# Patient Record
Sex: FEMALE | Race: OTHER | Employment: OTHER | URBAN - METROPOLITAN AREA
[De-identification: names, ages, dates, MRNs, and addresses within clinical notes are randomized per-mention and may not be internally consistent; named-entity substitution may affect disease eponyms.]

---

## 2024-01-29 ENCOUNTER — APPOINTMENT (EMERGENCY)
Dept: RADIOLOGY | Facility: HOSPITAL | Age: 69
End: 2024-01-29
Payer: OTHER GOVERNMENT

## 2024-01-29 ENCOUNTER — HOSPITAL ENCOUNTER (EMERGENCY)
Facility: HOSPITAL | Age: 69
End: 2024-01-30
Attending: EMERGENCY MEDICINE
Payer: OTHER GOVERNMENT

## 2024-01-29 VITALS
TEMPERATURE: 98.2 F | DIASTOLIC BLOOD PRESSURE: 64 MMHG | SYSTOLIC BLOOD PRESSURE: 140 MMHG | WEIGHT: 210 LBS | HEART RATE: 87 BPM | RESPIRATION RATE: 20 BRPM | OXYGEN SATURATION: 95 %

## 2024-01-29 DIAGNOSIS — N39.0 UTI (URINARY TRACT INFECTION): ICD-10-CM

## 2024-01-29 DIAGNOSIS — F32.A DEPRESSION: Primary | ICD-10-CM

## 2024-01-29 PROBLEM — Z00.8 ENCOUNTER FOR PSYCHOLOGICAL EVALUATION: Status: ACTIVE | Noted: 2024-01-29

## 2024-01-29 LAB
ALBUMIN SERPL BCP-MCNC: 3.7 G/DL (ref 3.5–5)
ALP SERPL-CCNC: 81 U/L (ref 34–104)
ALT SERPL W P-5'-P-CCNC: 10 U/L (ref 7–52)
AMPHETAMINES SERPL QL SCN: NEGATIVE
ANION GAP SERPL CALCULATED.3IONS-SCNC: 5 MMOL/L
AST SERPL W P-5'-P-CCNC: 13 U/L (ref 13–39)
ATRIAL RATE: 79 BPM
BACTERIA UR QL AUTO: ABNORMAL /HPF
BACTERIA UR QL AUTO: ABNORMAL /HPF
BARBITURATES UR QL: NEGATIVE
BASOPHILS # BLD MANUAL: 0 THOUSAND/UL (ref 0–0.1)
BASOPHILS NFR MAR MANUAL: 0 % (ref 0–1)
BENZODIAZ UR QL: NEGATIVE
BILIRUB SERPL-MCNC: 0.71 MG/DL (ref 0.2–1)
BILIRUB UR QL STRIP: NEGATIVE
BILIRUB UR QL STRIP: NEGATIVE
BUN SERPL-MCNC: 10 MG/DL (ref 5–25)
CALCIUM SERPL-MCNC: 9.5 MG/DL (ref 8.4–10.2)
CHLORIDE SERPL-SCNC: 103 MMOL/L (ref 96–108)
CLARITY UR: ABNORMAL
CLARITY UR: CLEAR
CO2 SERPL-SCNC: 28 MMOL/L (ref 21–32)
COCAINE UR QL: NEGATIVE
COLOR UR: YELLOW
COLOR UR: YELLOW
CREAT SERPL-MCNC: 0.54 MG/DL (ref 0.6–1.3)
D DIMER PPP FEU-MCNC: 0.33 UG/ML FEU
EOSINOPHIL # BLD MANUAL: 0 THOUSAND/UL (ref 0–0.4)
EOSINOPHIL NFR BLD MANUAL: 0 % (ref 0–6)
ERYTHROCYTE [DISTWIDTH] IN BLOOD BY AUTOMATED COUNT: 14 % (ref 11.6–15.1)
ETHANOL EXG-MCNC: 0 MG/DL
GFR SERPL CREATININE-BSD FRML MDRD: 97 ML/MIN/1.73SQ M
GLUCOSE SERPL-MCNC: 104 MG/DL (ref 65–140)
GLUCOSE UR STRIP-MCNC: NEGATIVE MG/DL
GLUCOSE UR STRIP-MCNC: NEGATIVE MG/DL
HCT VFR BLD AUTO: 46.6 % (ref 34.8–46.1)
HGB BLD-MCNC: 15 G/DL (ref 11.5–15.4)
HGB UR QL STRIP.AUTO: ABNORMAL
HGB UR QL STRIP.AUTO: NEGATIVE
KETONES UR STRIP-MCNC: NEGATIVE MG/DL
KETONES UR STRIP-MCNC: NEGATIVE MG/DL
LEUKOCYTE ESTERASE UR QL STRIP: ABNORMAL
LEUKOCYTE ESTERASE UR QL STRIP: ABNORMAL
LYMPHOCYTES # BLD AUTO: 0.74 THOUSAND/UL (ref 0.6–4.47)
LYMPHOCYTES # BLD AUTO: 11 % (ref 14–44)
MACROCYTES BLD QL AUTO: PRESENT
MCH RBC QN AUTO: 32.8 PG (ref 26.8–34.3)
MCHC RBC AUTO-ENTMCNC: 32.2 G/DL (ref 31.4–37.4)
MCV RBC AUTO: 102 FL (ref 82–98)
METHADONE UR QL: NEGATIVE
MONOCYTES # BLD AUTO: 0.2 THOUSAND/UL (ref 0–1.22)
MONOCYTES NFR BLD: 3 % (ref 4–12)
MUCOUS THREADS UR QL AUTO: ABNORMAL
MUCOUS THREADS UR QL AUTO: ABNORMAL
NEUTROPHILS # BLD MANUAL: 5.77 THOUSAND/UL (ref 1.85–7.62)
NEUTS SEG NFR BLD AUTO: 86 % (ref 43–75)
NITRITE UR QL STRIP: POSITIVE
NITRITE UR QL STRIP: POSITIVE
NON-SQ EPI CELLS URNS QL MICRO: ABNORMAL /HPF
NON-SQ EPI CELLS URNS QL MICRO: ABNORMAL /HPF
OPIATES UR QL SCN: NEGATIVE
OXYCODONE+OXYMORPHONE UR QL SCN: NEGATIVE
P AXIS: 43 DEGREES
PCP UR QL: NEGATIVE
PH UR STRIP.AUTO: 6 [PH]
PH UR STRIP.AUTO: 6 [PH] (ref 4.5–8)
PLATELET # BLD AUTO: 264 THOUSANDS/UL (ref 149–390)
PLATELET BLD QL SMEAR: ADEQUATE
PMV BLD AUTO: 9.5 FL (ref 8.9–12.7)
POTASSIUM SERPL-SCNC: 4.1 MMOL/L (ref 3.5–5.3)
PR INTERVAL: 128 MS
PROT SERPL-MCNC: 6.6 G/DL (ref 6.4–8.4)
PROT UR STRIP-MCNC: NEGATIVE MG/DL
PROT UR STRIP-MCNC: NEGATIVE MG/DL
QRS AXIS: -13 DEGREES
QRSD INTERVAL: 78 MS
QT INTERVAL: 372 MS
QTC INTERVAL: 426 MS
RBC # BLD AUTO: 4.57 MILLION/UL (ref 3.81–5.12)
RBC #/AREA URNS AUTO: ABNORMAL /HPF
RBC #/AREA URNS AUTO: ABNORMAL /HPF
RBC MORPH BLD: PRESENT
SODIUM SERPL-SCNC: 136 MMOL/L (ref 135–147)
SP GR UR STRIP.AUTO: 1.02 (ref 1–1.03)
SP GR UR STRIP.AUTO: 1.02 (ref 1–1.03)
T WAVE AXIS: 47 DEGREES
THC UR QL: NEGATIVE
TSH SERPL DL<=0.05 MIU/L-ACNC: 3.28 UIU/ML (ref 0.45–4.5)
UROBILINOGEN UR QL STRIP.AUTO: 0.2 E.U./DL
UROBILINOGEN UR STRIP-ACNC: <2 MG/DL
VENTRICULAR RATE: 79 BPM
WBC # BLD AUTO: 6.71 THOUSAND/UL (ref 4.31–10.16)
WBC #/AREA URNS AUTO: ABNORMAL /HPF
WBC #/AREA URNS AUTO: ABNORMAL /HPF

## 2024-01-29 PROCEDURE — 36415 COLL VENOUS BLD VENIPUNCTURE: CPT

## 2024-01-29 PROCEDURE — 84443 ASSAY THYROID STIM HORMONE: CPT

## 2024-01-29 PROCEDURE — 99284 EMERGENCY DEPT VISIT MOD MDM: CPT

## 2024-01-29 PROCEDURE — 80053 COMPREHEN METABOLIC PANEL: CPT

## 2024-01-29 PROCEDURE — 99285 EMERGENCY DEPT VISIT HI MDM: CPT | Performed by: EMERGENCY MEDICINE

## 2024-01-29 PROCEDURE — 71046 X-RAY EXAM CHEST 2 VIEWS: CPT

## 2024-01-29 PROCEDURE — 85007 BL SMEAR W/DIFF WBC COUNT: CPT

## 2024-01-29 PROCEDURE — 81001 URINALYSIS AUTO W/SCOPE: CPT

## 2024-01-29 PROCEDURE — 93005 ELECTROCARDIOGRAM TRACING: CPT

## 2024-01-29 PROCEDURE — 85379 FIBRIN DEGRADATION QUANT: CPT

## 2024-01-29 PROCEDURE — 80307 DRUG TEST PRSMV CHEM ANLYZR: CPT

## 2024-01-29 PROCEDURE — 85027 COMPLETE CBC AUTOMATED: CPT

## 2024-01-29 PROCEDURE — 82075 ASSAY OF BREATH ETHANOL: CPT

## 2024-01-29 RX ORDER — ATORVASTATIN CALCIUM 20 MG/1
20 TABLET, FILM COATED ORAL
Status: DISCONTINUED | OUTPATIENT
Start: 2024-01-29 | End: 2024-01-30 | Stop reason: HOSPADM

## 2024-01-29 RX ORDER — HYDROXYZINE HYDROCHLORIDE 25 MG/1
25 TABLET, FILM COATED ORAL
Status: CANCELLED | OUTPATIENT
Start: 2024-01-29

## 2024-01-29 RX ORDER — ACETAMINOPHEN 325 MG/1
650 TABLET ORAL EVERY 6 HOURS PRN
Status: DISCONTINUED | OUTPATIENT
Start: 2024-01-29 | End: 2024-01-30 | Stop reason: HOSPADM

## 2024-01-29 RX ORDER — LEVOTHYROXINE SODIUM 0.07 MG/1
150 TABLET ORAL
Status: DISCONTINUED | OUTPATIENT
Start: 2024-01-30 | End: 2024-01-30 | Stop reason: HOSPADM

## 2024-01-29 RX ORDER — MAGNESIUM HYDROXIDE/ALUMINUM HYDROXICE/SIMETHICONE 120; 1200; 1200 MG/30ML; MG/30ML; MG/30ML
30 SUSPENSION ORAL EVERY 4 HOURS PRN
Status: CANCELLED | OUTPATIENT
Start: 2024-01-29

## 2024-01-29 RX ORDER — RISPERIDONE 0.25 MG/1
0.5 TABLET ORAL
Status: CANCELLED | OUTPATIENT
Start: 2024-01-29

## 2024-01-29 RX ORDER — RISPERIDONE 1 MG/1
1 TABLET ORAL
Status: CANCELLED | OUTPATIENT
Start: 2024-01-29

## 2024-01-29 RX ORDER — LOSARTAN POTASSIUM 25 MG/1
25 TABLET ORAL DAILY
Status: DISCONTINUED | OUTPATIENT
Start: 2024-01-30 | End: 2024-01-30 | Stop reason: HOSPADM

## 2024-01-29 RX ORDER — LANOLIN ALCOHOL/MO/W.PET/CERES
3 CREAM (GRAM) TOPICAL
Status: CANCELLED | OUTPATIENT
Start: 2024-01-29

## 2024-01-29 RX ORDER — RISPERIDONE 0.25 MG/1
0.25 TABLET ORAL
Status: CANCELLED | OUTPATIENT
Start: 2024-01-29

## 2024-01-29 RX ORDER — CLOPIDOGREL BISULFATE 75 MG/1
75 TABLET ORAL ONCE
Status: COMPLETED | OUTPATIENT
Start: 2024-01-29 | End: 2024-01-29

## 2024-01-29 RX ORDER — HALOPERIDOL 5 MG/ML
5 INJECTION INTRAMUSCULAR
Status: CANCELLED | OUTPATIENT
Start: 2024-01-29

## 2024-01-29 RX ORDER — TRAMADOL HYDROCHLORIDE 50 MG/1
50 TABLET ORAL DAILY PRN
Status: DISCONTINUED | OUTPATIENT
Start: 2024-01-29 | End: 2024-01-30 | Stop reason: HOSPADM

## 2024-01-29 RX ORDER — POLYETHYLENE GLYCOL 3350 17 G/17G
17 POWDER, FOR SOLUTION ORAL DAILY PRN
Status: DISCONTINUED | OUTPATIENT
Start: 2024-01-29 | End: 2024-01-30 | Stop reason: HOSPADM

## 2024-01-29 RX ORDER — CEPHALEXIN 500 MG/1
500 CAPSULE ORAL 4 TIMES DAILY
Qty: 20 CAPSULE | Refills: 0 | Status: DISCONTINUED | OUTPATIENT
Start: 2024-01-29 | End: 2024-01-30 | Stop reason: HOSPADM

## 2024-01-29 RX ORDER — LORAZEPAM 2 MG/ML
1 INJECTION INTRAMUSCULAR
Status: CANCELLED | OUTPATIENT
Start: 2024-01-29

## 2024-01-29 RX ADMIN — TRAMADOL HYDROCHLORIDE 50 MG: 50 TABLET, COATED ORAL at 18:41

## 2024-01-29 RX ADMIN — CEPHALEXIN 500 MG: 500 CAPSULE ORAL at 18:41

## 2024-01-29 RX ADMIN — CLOPIDOGREL BISULFATE 75 MG: 75 TABLET ORAL at 18:41

## 2024-01-29 RX ADMIN — POLYETHYLENE GLYCOL 3350 17 G: 17 POWDER, FOR SOLUTION ORAL at 19:00

## 2024-01-29 RX ADMIN — ATORVASTATIN CALCIUM 20 MG: 20 TABLET, FILM COATED ORAL at 18:41

## 2024-01-29 NOTE — ED NOTES
Pt provided with urine specimen cup; pt states she is unable to urinate at this time. Pt offered PO fluids     Amira Bland  01/29/24 6774

## 2024-01-29 NOTE — ED NOTES
Patient is accepted at [facility].  Patient is accepted by Dr. Ramila Varghese.     Transportation is arranged with [facility].     Transportation is scheduled for TBD.   Patient may go to the floor at 2130 or later.          Nurse report is to be called to [phone] prior to patient transfer.

## 2024-01-29 NOTE — ED NOTES
201 sent to Intake for a bed at [facility] when available. Pt would prefer to be closer to home at this time before extending to [facility] for bed availability in network. Crisis to follow up.

## 2024-01-29 NOTE — ED ATTENDING ATTESTATION
"I, Shawn Carmichael MD, saw and evaluated the patient. I have discussed the patient with the resident and agree with the resident's findings, Plan of Care, and MDM as documented in the resident's note, except where noted. All available labs and Radiology studies were reviewed.  I was present for key portions of any procedure(s) performed by the resident and I was immediately available to provide assistance.    At this point I agree with the current assessment done in the Emergency Department.  I have conducted an independent evaluation of this patient a history and physical is as follows:    67 yo female with a history of HTN, hypothyroidism, prior DVT, and major depressive disorder presents to the ED complaining of depression and passive suicidal ideation. The patient says she recently lost her  and doesn't want to live anymore. (+) Vague plan to \"drink and smoke myself to death\". Family moved her to the area from [place] last week. No HI or hallucinations. She also has multiple other vague complaints including dizziness, \"lung pain\", and lower back pain x several days. No chest pain, shortness of breath, nausea, vomiting, or diaphoresis. Last alcohol intake about a week ago. No other specific complaints.    ROS: per resident physician note    Gen: NAD, AA&Ox3  HEENT: PERRL, EOMI  Neck: supple  CV: RRR  Lungs: CTA B/L  Abdomen: soft, NT/ND  Ext: no swelling or deformity  Neuro: 5/5 strength all extremities, sensation grossly intact  Skin: no rash    ED Course  The patient is comfortable appearing with stable vital signs and a benign physical examination. (+) Depression and passive SI. (+) Multiple other vague complaints. Will check EKG, CXR, basic labs, d dimer, TSH, UDS, and UA. Case discussed with Crisis --> they will evaluate the patient in the ED following medical clearance. Disposition per workup and crisis worker recommendations. Will continue to monitor.    1700 The patient signed 201 paperwork. " Inpatient bed search initiated.    Critical Care Time  Procedures

## 2024-01-29 NOTE — ED NOTES
Pt presented to the ED with her sister from home due to fleeting suicidal ideations and worsening depression.  was utilized and Pt sister remained present with her permission. Pt has had worsening suicidal ideation since her  passed away. She has had thoughts of drinking or smoking herself to death, although she does not currently feel that way. She is not currently taking any antidepressants or linked to outpatient supports. She moved to the area from [place] within the last week. She was hospitalized in the past for depression in [place]. She has no desire to live anymore, but is willing to get help for this. She denies any legal involvement or substance abuse. She denies any access to firearms. She is residing at her sisiter's home and feels safe there. Pt reports recently that she has had some auditory hallucinations related to her  passing, but would not elaborate further. Denies any psychosis or HI/VH. Pt is in agreement with signing a 201 and understands her rights. ED provider in agreement with treatment plan.

## 2024-01-30 ENCOUNTER — HOSPITAL ENCOUNTER (INPATIENT)
Facility: HOSPITAL | Age: 69
LOS: 7 days | Discharge: HOME | DRG: 885 | End: 2024-02-06
Attending: STUDENT IN AN ORGANIZED HEALTH CARE EDUCATION/TRAINING PROGRAM | Admitting: STUDENT IN AN ORGANIZED HEALTH CARE EDUCATION/TRAINING PROGRAM
Payer: MEDICARE

## 2024-01-30 DIAGNOSIS — Z00.8 MEDICAL CLEARANCE FOR PSYCHIATRIC ADMISSION: ICD-10-CM

## 2024-01-30 DIAGNOSIS — N39.0 UTI (URINARY TRACT INFECTION): ICD-10-CM

## 2024-01-30 DIAGNOSIS — E78.2 MIXED HYPERLIPIDEMIA: Primary | ICD-10-CM

## 2024-01-30 DIAGNOSIS — E03.9 ACQUIRED HYPOTHYROIDISM: ICD-10-CM

## 2024-01-30 DIAGNOSIS — K59.00 CONSTIPATION, UNSPECIFIED CONSTIPATION TYPE: ICD-10-CM

## 2024-01-30 DIAGNOSIS — F33.1 MODERATE EPISODE OF RECURRENT MAJOR DEPRESSIVE DISORDER (HCC): ICD-10-CM

## 2024-01-30 DIAGNOSIS — I10 ESSENTIAL HYPERTENSION: ICD-10-CM

## 2024-01-30 PROBLEM — F33.9 MAJOR DEPRESSIVE DISORDER, RECURRENT EPISODE (HCC): Status: ACTIVE | Noted: 2024-01-30

## 2024-01-30 PROBLEM — F10.10 ALCOHOL ABUSE: Status: ACTIVE | Noted: 2024-01-30

## 2024-01-30 PROBLEM — Z72.0 TOBACCO ABUSE: Status: ACTIVE | Noted: 2024-01-30

## 2024-01-30 LAB
25(OH)D3 SERPL-MCNC: 24.1 NG/ML (ref 30–100)
ANION GAP SERPL CALCULATED.3IONS-SCNC: 8 MMOL/L
BASOPHILS # BLD AUTO: 0.03 THOUSANDS/ÂΜL (ref 0–0.1)
BASOPHILS NFR BLD AUTO: 1 % (ref 0–1)
BUN SERPL-MCNC: 9 MG/DL (ref 5–25)
CALCIUM SERPL-MCNC: 9 MG/DL (ref 8.4–10.2)
CHLORIDE SERPL-SCNC: 101 MMOL/L (ref 96–108)
CHOLEST SERPL-MCNC: 110 MG/DL
CO2 SERPL-SCNC: 28 MMOL/L (ref 21–32)
CREAT SERPL-MCNC: 0.51 MG/DL (ref 0.6–1.3)
EOSINOPHIL # BLD AUTO: 0.08 THOUSAND/ÂΜL (ref 0–0.61)
EOSINOPHIL NFR BLD AUTO: 2 % (ref 0–6)
ERYTHROCYTE [DISTWIDTH] IN BLOOD BY AUTOMATED COUNT: 13.9 % (ref 11.6–15.1)
EST. AVERAGE GLUCOSE BLD GHB EST-MCNC: 108 MG/DL
FOLATE SERPL-MCNC: 6.3 NG/ML
GFR SERPL CREATININE-BSD FRML MDRD: 99 ML/MIN/1.73SQ M
GLUCOSE P FAST SERPL-MCNC: 106 MG/DL (ref 65–99)
GLUCOSE SERPL-MCNC: 106 MG/DL (ref 65–140)
HBA1C MFR BLD: 5.4 %
HCT VFR BLD AUTO: 44 % (ref 34.8–46.1)
HDLC SERPL-MCNC: 32 MG/DL
HGB BLD-MCNC: 14.1 G/DL (ref 11.5–15.4)
IMM GRANULOCYTES # BLD AUTO: 0.01 THOUSAND/UL (ref 0–0.2)
IMM GRANULOCYTES NFR BLD AUTO: 0 % (ref 0–2)
LDLC SERPL CALC-MCNC: 66 MG/DL (ref 0–100)
LYMPHOCYTES # BLD AUTO: 0.73 THOUSANDS/ÂΜL (ref 0.6–4.47)
LYMPHOCYTES NFR BLD AUTO: 16 % (ref 14–44)
MCH RBC QN AUTO: 31.8 PG (ref 26.8–34.3)
MCHC RBC AUTO-ENTMCNC: 32 G/DL (ref 31.4–37.4)
MCV RBC AUTO: 99 FL (ref 82–98)
MONOCYTES # BLD AUTO: 0.49 THOUSAND/ÂΜL (ref 0.17–1.22)
MONOCYTES NFR BLD AUTO: 11 % (ref 4–12)
NEUTROPHILS # BLD AUTO: 3.26 THOUSANDS/ÂΜL (ref 1.85–7.62)
NEUTS SEG NFR BLD AUTO: 70 % (ref 43–75)
NONHDLC SERPL-MCNC: 78 MG/DL
NRBC BLD AUTO-RTO: 0 /100 WBCS
PLATELET # BLD AUTO: 236 THOUSANDS/UL (ref 149–390)
PMV BLD AUTO: 9.8 FL (ref 8.9–12.7)
POTASSIUM SERPL-SCNC: 3.9 MMOL/L (ref 3.5–5.3)
RBC # BLD AUTO: 4.44 MILLION/UL (ref 3.81–5.12)
SODIUM SERPL-SCNC: 137 MMOL/L (ref 135–147)
TRIGL SERPL-MCNC: 61 MG/DL
VIT B12 SERPL-MCNC: 140 PG/ML (ref 180–914)
WBC # BLD AUTO: 4.6 THOUSAND/UL (ref 4.31–10.16)

## 2024-01-30 PROCEDURE — 85025 COMPLETE CBC W/AUTO DIFF WBC: CPT | Performed by: PSYCHIATRY & NEUROLOGY

## 2024-01-30 PROCEDURE — 99223 1ST HOSP IP/OBS HIGH 75: CPT | Performed by: STUDENT IN AN ORGANIZED HEALTH CARE EDUCATION/TRAINING PROGRAM

## 2024-01-30 PROCEDURE — 82746 ASSAY OF FOLIC ACID SERUM: CPT | Performed by: PSYCHIATRY & NEUROLOGY

## 2024-01-30 PROCEDURE — 80048 BASIC METABOLIC PNL TOTAL CA: CPT | Performed by: PSYCHIATRY & NEUROLOGY

## 2024-01-30 PROCEDURE — 99221 1ST HOSP IP/OBS SF/LOW 40: CPT | Performed by: NURSE PRACTITIONER

## 2024-01-30 PROCEDURE — 82607 VITAMIN B-12: CPT | Performed by: PSYCHIATRY & NEUROLOGY

## 2024-01-30 PROCEDURE — 80061 LIPID PANEL: CPT | Performed by: PSYCHIATRY & NEUROLOGY

## 2024-01-30 PROCEDURE — 83036 HEMOGLOBIN GLYCOSYLATED A1C: CPT | Performed by: NURSE PRACTITIONER

## 2024-01-30 PROCEDURE — 82306 VITAMIN D 25 HYDROXY: CPT | Performed by: PSYCHIATRY & NEUROLOGY

## 2024-01-30 RX ORDER — SENNOSIDES 8.6 MG
2 TABLET ORAL
Status: DISCONTINUED | OUTPATIENT
Start: 2024-01-30 | End: 2024-02-06 | Stop reason: HOSPADM

## 2024-01-30 RX ORDER — LORAZEPAM 2 MG/ML
1 INJECTION INTRAMUSCULAR
Status: DISCONTINUED | OUTPATIENT
Start: 2024-01-30 | End: 2024-02-06 | Stop reason: HOSPADM

## 2024-01-30 RX ORDER — DOCUSATE SODIUM 100 MG/1
100 CAPSULE, LIQUID FILLED ORAL 2 TIMES DAILY
Status: DISCONTINUED | OUTPATIENT
Start: 2024-01-30 | End: 2024-02-06 | Stop reason: HOSPADM

## 2024-01-30 RX ORDER — POLYETHYLENE GLYCOL 3350 17 G/17G
17 POWDER, FOR SOLUTION ORAL DAILY PRN
Status: DISCONTINUED | OUTPATIENT
Start: 2024-01-30 | End: 2024-02-06 | Stop reason: HOSPADM

## 2024-01-30 RX ORDER — NICOTINE 21 MG/24HR
1 PATCH, TRANSDERMAL 24 HOURS TRANSDERMAL DAILY
Status: DISCONTINUED | OUTPATIENT
Start: 2024-01-30 | End: 2024-02-06 | Stop reason: HOSPADM

## 2024-01-30 RX ORDER — LANOLIN ALCOHOL/MO/W.PET/CERES
3 CREAM (GRAM) TOPICAL
Status: DISCONTINUED | OUTPATIENT
Start: 2024-01-30 | End: 2024-02-06 | Stop reason: HOSPADM

## 2024-01-30 RX ORDER — MELATONIN
2000 DAILY
Status: DISCONTINUED | OUTPATIENT
Start: 2024-01-31 | End: 2024-02-06 | Stop reason: HOSPADM

## 2024-01-30 RX ORDER — LOSARTAN POTASSIUM 25 MG/1
25 TABLET ORAL DAILY
Status: DISCONTINUED | OUTPATIENT
Start: 2024-01-30 | End: 2024-02-06 | Stop reason: HOSPADM

## 2024-01-30 RX ORDER — LEVOTHYROXINE SODIUM 0.15 MG/1
150 TABLET ORAL
Status: DISCONTINUED | OUTPATIENT
Start: 2024-01-30 | End: 2024-01-31

## 2024-01-30 RX ORDER — CLOPIDOGREL BISULFATE 75 MG/1
75 TABLET ORAL DAILY
Status: DISCONTINUED | OUTPATIENT
Start: 2024-01-30 | End: 2024-02-06 | Stop reason: HOSPADM

## 2024-01-30 RX ORDER — HYDROXYZINE HYDROCHLORIDE 25 MG/1
25 TABLET, FILM COATED ORAL
Status: DISCONTINUED | OUTPATIENT
Start: 2024-01-30 | End: 2024-02-06 | Stop reason: HOSPADM

## 2024-01-30 RX ORDER — CYANOCOBALAMIN 1000 UG/ML
1000 INJECTION, SOLUTION INTRAMUSCULAR; SUBCUTANEOUS ONCE
Status: COMPLETED | OUTPATIENT
Start: 2024-01-30 | End: 2024-01-30

## 2024-01-30 RX ORDER — RISPERIDONE 0.25 MG/1
0.25 TABLET ORAL
Status: DISCONTINUED | OUTPATIENT
Start: 2024-01-30 | End: 2024-02-06 | Stop reason: HOSPADM

## 2024-01-30 RX ORDER — HALOPERIDOL 5 MG/ML
5 INJECTION INTRAMUSCULAR
Status: DISCONTINUED | OUTPATIENT
Start: 2024-01-30 | End: 2024-02-06 | Stop reason: HOSPADM

## 2024-01-30 RX ORDER — RISPERIDONE 1 MG/1
1 TABLET ORAL
Status: DISCONTINUED | OUTPATIENT
Start: 2024-01-30 | End: 2024-02-06 | Stop reason: HOSPADM

## 2024-01-30 RX ORDER — RISPERIDONE 0.5 MG/1
0.5 TABLET ORAL
Status: DISCONTINUED | OUTPATIENT
Start: 2024-01-30 | End: 2024-02-06 | Stop reason: HOSPADM

## 2024-01-30 RX ORDER — MAGNESIUM HYDROXIDE/ALUMINUM HYDROXICE/SIMETHICONE 120; 1200; 1200 MG/30ML; MG/30ML; MG/30ML
30 SUSPENSION ORAL EVERY 4 HOURS PRN
Status: DISCONTINUED | OUTPATIENT
Start: 2024-01-30 | End: 2024-02-06 | Stop reason: HOSPADM

## 2024-01-30 RX ORDER — ATORVASTATIN CALCIUM 20 MG/1
20 TABLET, FILM COATED ORAL
Status: DISCONTINUED | OUTPATIENT
Start: 2024-01-30 | End: 2024-02-06 | Stop reason: HOSPADM

## 2024-01-30 RX ADMIN — LOSARTAN POTASSIUM 25 MG: 25 TABLET, FILM COATED ORAL at 11:27

## 2024-01-30 RX ADMIN — CYANOCOBALAMIN 1000 MCG: 1000 INJECTION INTRAMUSCULAR; SUBCUTANEOUS at 17:19

## 2024-01-30 RX ADMIN — POLYETHYLENE GLYCOL 3350 17 G: 17 POWDER, FOR SOLUTION ORAL at 11:27

## 2024-01-30 RX ADMIN — NICOTINE 1 PATCH: 14 PATCH, EXTENDED RELEASE TRANSDERMAL at 13:32

## 2024-01-30 RX ADMIN — DOCUSATE SODIUM 100 MG: 100 CAPSULE, LIQUID FILLED ORAL at 17:19

## 2024-01-30 RX ADMIN — MELATONIN TAB 3 MG 3 MG: 3 TAB at 21:06

## 2024-01-30 RX ADMIN — CLOPIDOGREL BISULFATE 75 MG: 75 TABLET ORAL at 11:27

## 2024-01-30 RX ADMIN — LEVOTHYROXINE SODIUM 150 MCG: 150 TABLET ORAL at 11:28

## 2024-01-30 RX ADMIN — SENNOSIDES 17.2 MG: 8.6 TABLET, FILM COATED ORAL at 21:06

## 2024-01-30 RX ADMIN — ATORVASTATIN CALCIUM 20 MG: 20 TABLET, FILM COATED ORAL at 17:19

## 2024-01-30 RX ADMIN — DOCUSATE SODIUM 100 MG: 100 CAPSULE, LIQUID FILLED ORAL at 11:27

## 2024-01-30 NOTE — ASSESSMENT & PLAN NOTE
Patient will have a nicotine patch as well as gum available to her while she is in inpatient  Smoking cessation education

## 2024-01-30 NOTE — EMTALA/ACUTE CARE TRANSFER
[facility] EMERGENCY DEPARTMENT  [address] [address]  Dept: [phone]      EMTALA TRANSFER CONSENT    NAME Ramandeep Abel                                         1955                              MRN [id number]    I have been informed of my rights regarding examination, treatment, and transfer   by Dr. Shawn Carmichael MD    Benefits: Specialized equipment and/or services available at the receiving facility (Include comment)________________________ (Behavioral health)    Risks: Potential for delay in receiving treatment, Potential deterioration of medical condition, Increased discomfort during transfer      Transfer Request   I acknowledge that my medical condition has been evaluated and explained to me by the emergency department physician or other qualified medical person and/or my attending physician who has recommended and offered to me further medical examination and treatment. I understand the Hospital's obligation with respect to the treatment and stabilization of my emergency medical condition. I nevertheless request to be transferred. I release the Hospital, the doctor, and any other persons caring for me from all responsibility or liability for any injury or ill effects that may result from my transfer and agree to accept all responsibility for the consequences of my choice to transfer, rather than receive stabilizing treatment at the Hospital. I understand that because the transfer is my request, my insurance may not provide reimbursement for the services.  The Hospital will assist and direct me and my family in how to make arrangements for transfer, but the hospital is not liable for any fees charged by the transport service.  In spite of this understanding, I refuse to consent to further medical examination and treatment which has been offered to me, and request transfer to Accepting Facility Name, City & State : [facility],  [place]. I authorize the performance of emergency medical procedures and treatments upon me in both transit and upon arrival at the receiving facility.  Additionally, I authorize the release of any and all medical records to the receiving facility and request they be transported with me, if possible.    I authorize the performance of emergency medical procedures and treatments upon me in both transit and upon arrival at the receiving facility.  Additionally, I authorize the release of any and all medical records to the receiving facility and request they be transported with me, if possible.  I understand that the safest mode of transportation during a medical emergency is an ambulance and that the Hospital advocates the use of this mode of transport. Risks of traveling to the receiving facility by car, including absence of medical control, life sustaining equipment, such as oxygen, and medical personnel has been explained to me and I fully understand them.    (MARIA ELENA CORRECT BOX BELOW)  [  ]  I consent to the stated transfer and to be transported by ambulance/helicopter.  [  ]  I consent to the stated transfer, but refuse transportation by ambulance and accept full responsibility for my transportation by car.  I understand the risks of non-ambulance transfers and I exonerate the Hospital and its staff from any deterioration in my condition that results from this refusal.    X___________________________________________    DATE  24  TIME________  Signature of patient or legally responsible individual signing on patient behalf           RELATIONSHIP TO PATIENT_________________________          Provider Certification    NAME Markusaurelia Georgina Abel                                        [facility] 1955                              MRN [id number]    A medical screening exam was performed on the above named patient.  Based on the examination:    Condition Necessitating Transfer The primary encounter  diagnosis was Depression. A diagnosis of UTI (urinary tract infection) was also pertinent to this visit.    Patient Condition: The patient has been stabilized such that within reasonable medical probability, no material deterioration of the patient condition or the condition of the unborn child(quinn) is likely to result from the transfer    Reason for Transfer: Level of Care needed not available at this facility    Transfer Requirements: Facility [address], [place], [place]   Space available and qualified personnel available for treatment as acknowledged by Geena LEMOS; [phone]  Agreed to accept transfer and to provide appropriate medical treatment as acknowledged by       Dr. Mcgrath  Appropriate medical records of the examination and treatment of the patient are provided at the time of transfer   STAFF INITIAL WHEN COMPLETED _______  Transfer will be performed by qualified personnel from    and appropriate transfer equipment as required, including the use of necessary and appropriate life support measures.    Provider Certification: I have examined the patient and explained the following risks and benefits of being transferred/refusing transfer to the patient/family:  General risk, such as traffic hazards, adverse weather conditions, rough terrain or turbulence, possible failure of equipment (including vehicle or aircraft), or consequences of actions of persons outside the control of the transport personnel, Unanticipated needs of medical equipment and personnel during transport, Risk of worsening condition, The possibility of a transport vehicle being unavailable      Based on these reasonable risks and benefits to the patient and/or the unborn child(quinn), and based upon the information available at the time of the patient’s examination, I certify that the medical benefits reasonably to be expected from the provision of appropriate medical treatments at another medical facility outweigh the increasing risks, if  any, to the individual’s medical condition, and in the case of labor to the unborn child, from effecting the transfer.    X____________________________________________ DATE 01/29/24        TIME_______      ORIGINAL - SEND TO MEDICAL RECORDS   COPY - SEND WITH PATIENT DURING TRANSFER

## 2024-01-30 NOTE — ASSESSMENT & PLAN NOTE
Patient will continue on her home dose of losartan 25 mg p.o. daily  Patient will continue on her Plavix 75 mg p.o. daily  We will monitor her blood pressure and additively agents as needed

## 2024-01-30 NOTE — ED NOTES
Insurance Authorization for admission:   Phone call placed to [facility]  Phone number: [phone]    Unable to do a precert due to not having the exact phone number they had on file as well as the license number

## 2024-01-30 NOTE — NURSING NOTE
"Ramandeep is a 68 y.o female admitted to the unit from [facility] ED under a 201 treatment status for worsening depression and fleeting SI thoughts to drink or smoke to death. Pt is a BMAT 4 , A/O x4. As to what brought pt to the hospital, she stated \"I have depression\". Pt reported that she lived in [place] and only came to [place] a week ago to meet her sister d/t worsening depressive symptoms. Pt attributes depressive and anxiety symptoms to recent loss of spouse 5 months ago. Pt further reported that she started drinking alcohol and smoking 1/2 pack/day since losing her . Pt reported being a smoker since she was 25 years of age. Reported last alcohol intake to be 2 weeks ago from today. Endorsing severe depression and moderate anxiety during this admission encounter. Reported ongoing back pain issues on admission.   LBM 1/26. Skin intact.     Expressed interest in being screened for Hep C  Expressed readiness to quite smoking.   "

## 2024-01-30 NOTE — PROGRESS NOTES
01/30/24 1207   Housing Stability   In the last 12 months, was there a time when you were not able to pay the mortgage or rent on time? N   In the last 12 months, how many places have you lived? 1   In the last 12 months, was there a time when you did not have a steady place to sleep or slept in a shelter (including now)? N   Transportation Needs   In the past 12 months, has lack of transportation kept you from medical appointments or from getting medications? no   In the past 12 months, has lack of transportation kept you from meetings, work, or from getting things needed for daily living? No   Food Insecurity   Within the past 12 months, you worried that your food would run out before you got the money to buy more. Never true   Within the past 12 months, the food you bought just didn't last and you didn't have money to get more. Never true   Intimate Partner Violence   Within the last year, have you been afraid of your partner or ex-partner? No   Within the last year, have you been humiliated or emotionally abused in other ways by your partner or ex-partner? No   Within the last year, have you been kicked, hit, slapped, or otherwise physically hurt by your partner or ex-partner? No   Within the last year, have you been raped or forced to have any kind of sexual activity by your partner or ex-partner? No   Utilities   In the past 12 months has the electric, gas, oil, or water company threatened to shut off services in your home? No

## 2024-01-30 NOTE — ASSESSMENT & PLAN NOTE
Patient has not had a bowel movement in 4 days  Daily senna ordered  Colace twice daily  MiraLAX as needed  Continue to monitor for routine bowel movements

## 2024-01-30 NOTE — NURSING NOTE
Patient is visible, calm, and pleasant on approach. Patient offers no c/o at the moment. Patient is medication compliant, and cooperative with assessment questions. Patient is withdrawn to room, reports depression. No behavioral issues noted. Safety checks ongoing.

## 2024-01-30 NOTE — H&P
"Psychiatric Evaluation - Behavioral Health   Ramandeep Mendez [facility] 68 y.o. female MRN: [id number]  Unit/Bed#: [facility] 643-01 Encounter: [id number]    Assessment/Plan   Active Problems:    Medical clearance for psychiatric admission    Acquired hypothyroidism    Tobacco abuse    Alcohol abuse    Plan:   Patient is currently admitted voluntarily as a 201  Plan for the following psychopharmacologic changes:  Start Prozac 10mg oral daily for depression and anxiety symptoms  Encourage group, occupational, and milieu therapy.  Collaborate with case management for disposition planning  Discuss with family for baseline assessment and disposition planning.  Admission labs reviewed  Medicine consulted for medical clearance and further medical management as indicated    Treatment options and alternatives were reviewed with the patient, who concurs with the above plan.  Risks, benefits, and possible side effects of medications were explained to the patient, who verbalizes understanding.        -----------------------------------    Chief Complaint: \"Im here for my depression and anxiety\"    History of Present Illness     Ramandeep is a 68 y.o. female with past medical history of COPD, HTN, hyperlipidemia, hypothyroidism and pertinent past psychiatric history of depression and anxiety who presents voluntarily on a 201 commitment with worsening depressive symptoms and thoughts of harming herself.    Symptoms prior to admission include worsening depressed mood, anhedonia, decreased energy, guilt, suicidal ideation, poor appetite, weight loss, and anxiety symptoms. Symptoms have been worsening for the past 5 months with slowly worsening course since that time. Stressors preceding admission include  the death of her  5 months ago and the death of a close friend 1 month ago . Please see documentation from the ED/Crisis/Consulting physician for further details about initial presentation.    On initial evaluation, " Ramandeep states that she is experiencing worsening depression and anxiety since her  passed away 5 months ago. She complains of suicidal ideation without a plan, decreased energy, generalized weakness, sadness, anhedonia, decreased appetite, guilt, and weight loss. Depressive symptoms are currently a 9/10 and anxiety symptoms are currently 7/10. She is currently here visiting her sister and currently lives in [facility]. Patient reports depression and anxiety for several years with a past hospitalization 2 years ago for depression and anxiety. Pt reports being on medications for depression and anxiety in the past but is unsure of medication names. Patient is not currently on psychiatric/antidepressant medications and is not currently following up with outpatient psychiatry/behavior health. Patient denies symptoms consistent with leida such as periods of elevated mood, distractibility, and decreased need for sleep. Patient also denies prior suicide attempts, homicidal ideation, auditory/visual hallucinations, paranoia or delusions. She is amenable to starting medication and contracts for safety. Her goal is to improve depression symptoms during this hospital admission.    Medical Review Of Systems:  Reports mild SOB, but denies any other     Psychiatric Review Of Systems:  Sleep: Denies, gets approximately 7 hours of sleep a night  Interest/Anhedonia: yes  Guilt/hopeless: Yes  Low energy/anergy: yes  Poor Concentration: no  Appetite changes: Yes, decreased  Weight changes: Yes, decreased  Somatic symptoms: no  Anxiety/panic:  reports anxiety  Leida: no  Self injurious behavior/risky behavior: no  Trauma: no   If yes: none  Hallucinations: Denies  Suicidal Ideation: Denies  Homicidal Ideation: Denies    Historical Information     Psychiatric History:   Inpatient hospitalizations: reports hospitalization 2 years ago for anxiety and depression  Suicide attempts: patient denies  Self injurious behavior:  no  Violent behavior: patient denies  Outpatient treatment: Patient reports prior   Psychiatric medication trial: Multiple, patient is unsure which medications have been trialed previously  Eating Disorder:Denies  OCD:Denies    Substance Abuse History:  Social History     Tobacco Use    Smoking status: Every Day     Current packs/day: 0.50     Average packs/day: 0.5 packs/day for 43.1 years (21.5 ttl pk-yrs)     Types: Cigarettes     Start date: 1981    Smokeless tobacco: Never   Substance Use Topics    Alcohol use: Not Currently     Comment: last ETOH drink was 2 weeks ago    Drug use: Never      Patient reports increasing tobacco use to half pack of cigarettes a day. Has been smoking for 25 years. Patient consumes 3 beers daily.     I have assessed this patient for substance use within the past 12 months.    Family Psychiatric History:   Patient denies any known family history of psychiatric illness, suicide attempt, or substance abuse    Social History:  Education: high school diploma/GED  Learning Disabilities: denies  Marital history:   Children: none  Living arrangement: lives alone in [facility]. Currently here visiting her sister  Occupational History: retired  Functioning Relationships: reports that she .  Access to Firearms: denies  Other Pertinent History: None      Traumatic History:   Abuse: denies  Other Traumatic Events: denies    Past Medical History:   Seizure history: none  Head injury: Denies  Past Medical History:   Diagnosis Date    Anxiety     Blood clot in vein     Depression     Disease of thyroid gland     Hypertension         -----------------------------------  Objective    Temp:  [97.5 °F (36.4 °C)-98.2 °F (36.8 °C)] 98.1 °F (36.7 °C)  HR:  [70-87] 81  Resp:  [16-20] 16  BP: (130-175)/(64-78) 142/67    Mental Status Evaluation:    Appearance:  age appropriate, casually dressed, dressed in hospital attire   Behavior:  normal, pleasant, cooperative   Speech:  normal rate  and volume   Mood:  depressed, crying during part of interview   Affect:  normal range and intensity, tearful   Language: naming objects and repeating phrases   Thought Process:  organized, logical, coherent, goal directed, linear   Associations: intact associations   Thought Content:  normal   Perceptual Disturbances: none   Risk Potential: Suicidal ideation - Yes, without plan  Homicidal ideation - None  Potential for aggression - No   Sensorium:  oriented to person, place, and time/date   Memory:  recent and remote memory grossly intact   Consciousness:  alert and awake   Attention/Concentration: attention span and concentration are age appropriate   Intellect: within normal limits   Fund of Knowledge: awareness of current events: yes   Insight:  limited   Judgment: limited   Muscle Strength:   Muscle Tone: normal  normal   Gait/Station: normal gait/station, normal balance   Motor Activity: no abnormal movements       Meds/Allergies   No Known Allergies  current meds:   Current Facility-Administered Medications   Medication Dose Route Frequency    aluminum-magnesium hydroxide-simethicone (MAALOX) oral suspension 30 mL  30 mL Oral Q4H PRN    atorvastatin (LIPITOR) tablet 20 mg  20 mg Oral Daily With Dinner    clopidogrel (PLAVIX) tablet 75 mg  75 mg Oral Daily    docusate sodium (COLACE) capsule 100 mg  100 mg Oral BID    haloperidol lactate (HALDOL) injection 5 mg  5 mg Intramuscular Q4H PRN Max 4/day    hydrOXYzine HCL (ATARAX) tablet 25 mg  25 mg Oral Q6H PRN Max 4/day    levothyroxine tablet 150 mcg  150 mcg Oral Early Morning    LORazepam (ATIVAN) injection 1 mg  1 mg Intramuscular Q6H PRN Max 3/day    losartan (COZAAR) tablet 25 mg  25 mg Oral Daily    melatonin tablet 3 mg  3 mg Oral HS    nicotine polacrilex (NICORETTE) gum 4 mg  4 mg Oral Q2H PRN    polyethylene glycol (MIRALAX) packet 17 g  17 g Oral Daily PRN    risperiDONE (RisperDAL) tablet 0.25 mg  0.25 mg Oral Q4H PRN Max 6/day    risperiDONE  (RisperDAL) tablet 0.5 mg  0.5 mg Oral Q4H PRN Max 3/day    risperiDONE (RisperDAL) tablet 1 mg  1 mg Oral Q2H PRN Max 3/day    senna (SENOKOT) tablet 17.2 mg  2 tablet Oral HS       Behavioral Health Medications: all current active meds have been reviewed. Changes as above.    Laboratory results:  I have personally reviewed all pertinent laboratory/tests results.  Recent Results (from the past 48 hour(s))   ECG 12 lead    Collection Time: 01/29/24 10:20 AM   Result Value Ref Range    Ventricular Rate 79 BPM    Atrial Rate 79 BPM    VA Interval 128 ms    QRSD Interval 78 ms    QT Interval 372 ms    QTC Interval 426 ms    P [place] 43 degrees    QRS Axis -13 degrees    T Wave Axis 47 degrees   CBC and differential    Collection Time: 01/29/24 12:01 PM   Result Value Ref Range    WBC 6.71 4.31 - 10.16 Thousand/uL    RBC 4.57 3.81 - 5.12 Million/uL    Hemoglobin 15.0 11.5 - 15.4 g/dL    Hematocrit 46.6 (H) 34.8 - 46.1 %     (H) 82 - 98 fL    MCH 32.8 26.8 - 34.3 pg    MCHC 32.2 31.4 - 37.4 g/dL    RDW 14.0 11.6 - 15.1 %    MPV 9.5 8.9 - 12.7 fL    Platelets 264 149 - 390 Thousands/uL   Comprehensive metabolic panel    Collection Time: 01/29/24 12:01 PM   Result Value Ref Range    Sodium 136 135 - 147 mmol/L    Potassium 4.1 3.5 - 5.3 mmol/L    Chloride 103 96 - 108 mmol/L    CO2 28 21 - 32 mmol/L    ANION GAP 5 mmol/L    BUN 10 5 - 25 mg/dL    Creatinine 0.54 (L) 0.60 - 1.30 mg/dL    Glucose 104 65 - 140 mg/dL    Calcium 9.5 8.4 - 10.2 mg/dL    AST 13 13 - 39 U/L    ALT 10 7 - 52 U/L    Alkaline Phosphatase 81 34 - 104 U/L    Total Protein 6.6 6.4 - 8.4 g/dL    Albumin 3.7 3.5 - 5.0 g/dL    Total Bilirubin 0.71 0.20 - 1.00 mg/dL    eGFR 97 ml/min/1.73sq m   TSH    Collection Time: 01/29/24 12:01 PM   Result Value Ref Range    TSH 3RD GENERATON 3.281 0.450 - 4.500 uIU/mL   D-Dimer    Collection Time: 01/29/24 12:01 PM   Result Value Ref Range    D-Dimer, Quant 0.33 <0.50 ug/ml FEU   Manual Differential(PHLEBS Do Not  Order)    Collection Time: 01/29/24 12:01 PM   Result Value Ref Range    Segmented % 86 (H) 43 - 75 %    Lymphocytes % 11 (L) 14 - 44 %    Monocytes % 3 (L) 4 - 12 %    Eosinophils, % 0 0 - 6 %    Basophils % 0 0 - 1 %    Absolute Neutrophils 5.77 1.85 - 7.62 Thousand/uL    Lymphocytes Absolute 0.74 0.60 - 4.47 Thousand/uL    Monocytes Absolute 0.20 0.00 - 1.22 Thousand/uL    Eosinophils Absolute 0.00 0.00 - 0.40 Thousand/uL    Basophils Absolute 0.00 0.00 - 0.10 Thousand/uL    Total Counted      RBC Morphology Present     Platelet Estimate Adequate Adequate    Macrocytes Present    POCT alcohol breath test    Collection Time: 01/29/24 12:39 PM   Result Value Ref Range    EXTBreath Alcohol 0.000    Rapid drug screen, urine    Collection Time: 01/29/24  1:06 PM   Result Value Ref Range    Amph/Meth UR Negative Negative    Barbiturate Ur Negative Negative    Benzodiazepine Urine Negative Negative    Cocaine Urine Negative Negative    Methadone Urine Negative Negative    Opiate Urine Negative Negative    PCP Ur Negative Negative    THC Urine Negative Negative    Oxycodone Urine Negative Negative   Urine Macroscopic, POC    Collection Time: 01/29/24  3:21 PM   Result Value Ref Range    Color, UA Yellow     Clarity, UA Clear     pH, UA 6.0 4.5 - 8.0    Leukocytes, UA Trace (A) Negative    Nitrite, UA Positive (A) Negative    Protein, UA Negative Negative mg/dl    Glucose, UA Negative Negative mg/dl    Ketones, UA Negative Negative mg/dl    Urobilinogen, UA 0.2 0.2, 1.0 E.U./dl E.U./dl    Bilirubin, UA Negative Negative    Occult Blood, UA Trace (A) Negative    Specific Gravity, UA 1.025 1.003 - 1.030   Urine Microscopic    Collection Time: 01/29/24  3:21 PM   Result Value Ref Range    RBC, UA None Seen None Seen, 1-2 /hpf    WBC, UA 4-10 (A) None Seen, 1-2 /hpf    Epithelial Cells Occasional None Seen, Occasional /hpf    Bacteria, UA Occasional None Seen, Occasional /hpf    MUCUS THREADS Occasional (A) None Seen   UA w  Reflex to Microscopic w Reflex to Culture    Collection Time: 01/29/24  3:22 PM    Specimen: Urine, Clean Catch   Result Value Ref Range    Color, UA Yellow     Clarity, UA Hazy     Specific [place], UA 1.017 1.003 - 1.030    pH, UA 6.0 4.5, 5.0, 5.5, 6.0, 6.5, 7.0, 7.5, 8.0    Leukocytes, UA Trace (A) Negative    Nitrite, UA Positive (A) Negative    Protein, UA Negative Negative mg/dl    Glucose, UA Negative Negative mg/dl    Ketones, UA Negative Negative mg/dl    Urobilinogen, UA <2.0 <2.0 mg/dl mg/dl    Bilirubin, UA Negative Negative    Occult Blood, UA Negative Negative   Urine Microscopic    Collection Time: 01/29/24  3:22 PM   Result Value Ref Range    RBC, UA None Seen None Seen, 1-2 /hpf    WBC, UA 2-4 (A) None Seen, 1-2 /hpf    Epithelial Cells Occasional None Seen, Occasional /hpf    Bacteria, UA Occasional None Seen, Occasional /hpf    MUCUS THREADS Occasional (A) None Seen   Basic metabolic panel    Collection Time: 01/30/24  5:08 AM   Result Value Ref Range    Sodium 137 135 - 147 mmol/L    Potassium 3.9 3.5 - 5.3 mmol/L    Chloride 101 96 - 108 mmol/L    CO2 28 21 - 32 mmol/L    ANION GAP 8 mmol/L    BUN 9 5 - 25 mg/dL    Creatinine 0.51 (L) 0.60 - 1.30 mg/dL    Glucose 106 65 - 140 mg/dL    Glucose, Fasting 106 (H) 65 - 99 mg/dL    Calcium 9.0 8.4 - 10.2 mg/dL    eGFR 99 ml/min/1.73sq m   CBC and differential    Collection Time: 01/30/24  5:08 AM   Result Value Ref Range    WBC 4.60 4.31 - 10.16 Thousand/uL    RBC 4.44 3.81 - 5.12 Million/uL    Hemoglobin 14.1 11.5 - 15.4 g/dL    Hematocrit 44.0 34.8 - 46.1 %    MCV 99 (H) 82 - 98 fL    MCH 31.8 26.8 - 34.3 pg    MCHC 32.0 31.4 - 37.4 g/dL    RDW 13.9 11.6 - 15.1 %    MPV 9.8 8.9 - 12.7 fL    Platelets 236 149 - 390 Thousands/uL    nRBC 0 /100 WBCs    Neutrophils Relative 70 43 - 75 %    Immat GRANS % 0 0 - 2 %    Lymphocytes Relative 16 14 - 44 %    Monocytes Relative 11 4 - 12 %    Eosinophils Relative 2 0 - 6 %    Basophils Relative 1 0 - 1 %     Neutrophils Absolute 3.26 1.85 - 7.62 Thousands/µL    Immature Grans Absolute 0.01 0.00 - 0.20 Thousand/uL    Lymphocytes Absolute 0.73 0.60 - 4.47 Thousands/µL    Monocytes Absolute 0.49 0.17 - 1.22 Thousand/µL    Eosinophils Absolute 0.08 0.00 - 0.61 Thousand/µL    Basophils Absolute 0.03 0.00 - 0.10 Thousands/µL   Lipid panel    Collection Time: 01/30/24  5:08 AM   Result Value Ref Range    Cholesterol 110 See Comment mg/dL    Triglycerides 61 See Comment mg/dL    HDL, Direct 32 (L) >=50 mg/dL    LDL Calculated 66 0 - 100 mg/dL    Non-HDL-Chol (CHOL-HDL) 78 mg/dl        Imaging Studies: Chest XR shows no acute cardiopulmonary disease  No orders to display        Code Status: Level 1 - Full Code  Advance Directive and Living Will: <no information>    Suicide/Homicide Risk Assessment:    Risk of Harm to Self:   The following ratings are based on assessment at the time of the interview  Demographic risk factors include:  status, age: over 50 or older  Historical Risk Factors include: chronic depression, chronic anxiety symptoms, alcohol use  Current Specific Risk Factors include: has suicidal ideation without a plan, diagnosis of depression  Protective Factors: ability to communicate with staff on the unit, able to contract for safety on the unit  Weapons/Firearms: none and no firearms. The following steps have been taken to ensure weapons are properly secured: not applicable  Based on today's assessment, Markususmania presents the following risk of harm to self: high    Risk of Harm to Others:  The following ratings are based on assessment at the time of the interview  Demographic Risk Factors include: none.  Historical Risk Factors include: alcohol abuse.  Current Specific Risk Factors include: none  Protective Factors: no current homicidal ideation  Based on today's assessment, Markusaurelia presents the following risk of harm to others: low    The following interventions are recommended: behavioral checks every 7  minutes, continued hospitalization on locked unit     -----------------------------------    Risks / Benefits of Treatment:     Risks, benefits, and possible side effects of medications explained to patient and patient verbalizes understanding.       Counseling / Coordination of Care:     Patient's presentation on admission and proposed treatment plan were discussed with the treatment team.  Diagnosis, medication changes and treatment plan were reviewed with the patient.  Recent stressors and events leading to admission were discussed with the patient.  Importance of medication and treatment compliance was reviewed with the patient.            Hubert Barbosa  MS3

## 2024-01-30 NOTE — PLAN OF CARE
Problem: SELF HARM/SUICIDALITY  Goal: Will have no self-injury during hospital stay  Description: INTERVENTIONS:  - Q 15 MINUTES: Routine safety checks  - Q WAKING SHIFT & PRN: Assess risk to determine if routine checks are adequate to maintain patient safety  - Encourage patient to participate actively in care by formulating a plan to combat response to suicidal ideation, identify supports and resources  Outcome: Progressing     Problem: DEPRESSION  Goal: Will be euthymic at discharge  Description: INTERVENTIONS:  - Administer medication as ordered  - Provide emotional support via 1:1 interaction with staff  - Encourage involvement in milieu/groups/activities  - Monitor for social isolation  Outcome: Progressing     Problem: ANXIETY  Goal: Will report anxiety at manageable levels  Description: INTERVENTIONS:  - Administer medication as ordered  - Teach and encourage coping skills  - Provide emotional support  - Assess patient/family for anxiety and ability to cope  Outcome: Progressing  Goal: By discharge: Patient will verbalize 2 strategies to deal with anxiety  Description: Interventions:  - Identify any obvious source/trigger to anxiety  - Staff will assist patient in applying identified coping technique/skills  - Encourage attendance of scheduled groups and activities  Outcome: Progressing

## 2024-01-30 NOTE — ASSESSMENT & PLAN NOTE
Patient's ER visit states that she has hypothyroidism  Her TSH from 129 is 3.281  Continue on her home dose of Synthroid 150 mcg p.o. daily

## 2024-01-30 NOTE — ASSESSMENT & PLAN NOTE
Patient reports excessive alcohol intake since the death of her  approximately 5 months ago  Alcohol cessation education

## 2024-01-30 NOTE — CONSULTS
[facility]  Consult  Name: Ramandeep Abel 68 y.o. female I MRN: [id number]  Unit/Bed#: [facility] 643-01 I Date of Admission: 1/30/2024   Date of Service: 1/30/2024 I Hospital Day: 0    Inpatient consult for Medical Clearance for BH patient  Consult performed by: MIRIAM Hayes  Consult ordered by: Carlotta Mcgrath MD          Assessment/Plan   Medical clearance for psychiatric admission  Assessment & Plan  Vital signs stable afebrile patient seen and examined by myself Labs from today were reviewed by myself sodium 137 potassium 3.9 creatinine 0.51  Patient medically stable for admit  SL IM will sign off please call with any questions or concerns    Constipation  Assessment & Plan  Patient has not had a bowel movement in 4 days  Daily senna ordered  Colace twice daily  MiraLAX as needed  Continue to monitor for routine bowel movements    Mixed hyperlipidemia  Assessment & Plan  Patient will continue on her home dose of Lipitor 20 mg p.o. daily    Essential hypertension  Assessment & Plan  Patient will continue on her home dose of losartan 25 mg p.o. daily  Patient will continue on her Plavix 75 mg p.o. daily  We will monitor her blood pressure and additively agents as needed    Alcohol abuse  Assessment & Plan  Patient reports excessive alcohol intake since the death of her  approximately 5 months ago  Alcohol cessation education    Tobacco abuse  Assessment & Plan  Patient will have a nicotine patch as well as gum available to her while she is in inpatient  Smoking cessation education    Acquired hypothyroidism  Assessment & Plan  Patient's ER visit states that she has hypothyroidism  Her TSH from 129 is 3.281  Continue on her home dose of Synthroid 150 mcg p.o. daily           Counseling / Coordination of Care Time: 45 minutes.  Greater than 50% of total time spent on patient counseling and coordination of care.    Collaboration of Care: Were  Recommendations Directly Discussed with Primary Treatment Team? - No     History of Present Illness:    Ramandeep Abel is a 68 y.o. female who is originally admitted to the psychiatry service due to worsening depression, SI.. We are consulted for medical clearance for admission to Behavioral Health Unit and treatment of underlying psychiatric illness.      Patient presents 1/29/2024 to [facility] ED with her sister from their home.  Patient has just relocated from [place] approximately 1 week ago.  Patient's  passed away approximately 5 months ago.  She is having worsening depression stating that she wants to smoke herself to death and drink herself to death since her  has passed away.  She does have a history of depression and having been hospitalized in [place] in the past.  Past medical history is questionable for hypothyroidism.  She has been drinking excessively since the death of her .  She is a lifetime smoker since the age of 25 and there is no other significant past medical history.  She does have chronic low back pain.    Review of Systems:    Review of Systems   Constitutional:  Negative for chills and fever.   HENT:  Negative for ear pain and sore throat.    Eyes:  Negative for pain and visual disturbance.   Respiratory:  Negative for cough and shortness of breath.    Cardiovascular:  Negative for chest pain and palpitations.   Gastrointestinal:  Negative for abdominal pain and vomiting.   Genitourinary:  Negative for dysuria and hematuria.   Musculoskeletal:  Positive for back pain. Negative for arthralgias.   Skin:  Negative for color change and rash.   Neurological:  Negative for seizures and syncope.   Psychiatric/Behavioral:  Positive for dysphoric mood.    All other systems reviewed and are negative.      Past Medical and Surgical History:     Past Medical History:   Diagnosis Date    Anxiety     Blood clot in vein     Depression     Disease of  "thyroid gland     Hypertension        No past surgical history on file.    Meds/Allergies:    all medications and allergies reviewed    Allergies: No Known Allergies    Social History:     Marital Status: /Civil Union    Substance Use History:   Social History     Substance and Sexual Activity   Alcohol Use Not Currently    Comment: last ETOH drink was 2 weeks ago     Social History     Tobacco Use   Smoking Status Every Day    Current packs/day: 0.50    Average packs/day: 0.5 packs/day for 43.1 years (21.5 ttl pk-yrs)    Types: Cigarettes    Start date: 1981   Smokeless Tobacco Never     Social History     Substance and Sexual Activity   Drug Use Never       Family History:    non-contributory    Physical Exam:     Vitals:   Blood Pressure: 142/67 (01/30/24 0804)  Pulse: 81 (01/30/24 0804)  Temperature: 98.1 °F (36.7 °C) (01/30/24 0804)  Temp Source: Temporal (01/30/24 0804)  Respirations: 16 (01/30/24 0804)  Height: 5' 4\" (162.6 cm) (01/30/24 0050)  Weight - Scale: 94.3 kg (208 lb) (01/30/24 0050)  SpO2: 92 % (01/30/24 0804)    Physical Exam  Constitutional:       General: She is not in acute distress.     Appearance: Normal appearance. She is not ill-appearing.   HENT:      Head: Normocephalic and atraumatic.      Nose: Nose normal.      Mouth/Throat:      Mouth: Mucous membranes are moist.   Eyes:      Extraocular Movements: Extraocular movements intact.   Cardiovascular:      Rate and Rhythm: Normal rate and regular rhythm.      Heart sounds: Normal heart sounds.   Pulmonary:      Breath sounds: Normal breath sounds. No wheezing.   Abdominal:      General: Abdomen is flat. Bowel sounds are normal.      Palpations: Abdomen is soft.   Skin:     General: Skin is warm and dry.   Neurological:      Mental Status: She is alert and oriented to person, place, and time.      Comments:  used for the H&P patient's primary language is Korean         Additional Data:     Lab Results: I have " "personally reviewed pertinent reports.      Results from last 7 days   Lab Units 01/30/24  0508   WBC Thousand/uL 4.60   HEMOGLOBIN g/dL 14.1   HEMATOCRIT % 44.0   PLATELETS Thousands/uL 236   NEUTROS PCT % 70   LYMPHS PCT % 16   MONOS PCT % 11   EOS PCT % 2     Results from last 7 days   Lab Units 01/30/24  0508 01/29/24  1201   SODIUM mmol/L 137 136   POTASSIUM mmol/L 3.9 4.1   CHLORIDE mmol/L 101 103   CO2 mmol/L 28 28   BUN mg/dL 9 10   CREATININE mg/dL 0.51* 0.54*   ANION GAP mmol/L 8 5   CALCIUM mg/dL 9.0 9.5   ALBUMIN g/dL  --  3.7   TOTAL BILIRUBIN mg/dL  --  0.71   ALK PHOS U/L  --  81   ALT U/L  --  10   AST U/L  --  13   GLUCOSE RANDOM mg/dL 106 104             No results found for: \"HGBA1C\"        EKG, Pathology, and Other Studies Reviewed on Admission:   EKG 1/29/2024 twelve-lead EKG reviewed by myself as well as interpreted by myself ventricular rate 79 QT interval 372 QTc C interval 426 normal sinus rhythm normal EKG no previous EKGs to compare with no acute EKG findings noted in this EKG    ** Please Note: This note has been constructed using a voice recognition system. **    I have spent a total time of 45 minutes on 01/30/24 in caring for this patient including Diagnostic results, Prognosis, Risks and benefits of tx options, Instructions for management, Patient and family education, Importance of tx compliance, Risk factor reductions, Impressions, Counseling / Coordination of care, Documenting in the medical record, Reviewing / ordering tests, medicine, procedures  , Obtaining or reviewing history  , and Communicating with other healthcare professionals .   "

## 2024-01-30 NOTE — ASSESSMENT & PLAN NOTE
Vital signs stable afebrile patient seen and examined by myself Labs from today were reviewed by myself sodium 137 potassium 3.9 creatinine 0.51  Patient medically stable for admit  SL IM will sign off please call with any questions or concerns

## 2024-01-30 NOTE — CASE MANAGEMENT
Case Management Assessment      Psychosocial Assessment 1:1:   CM met with the patient privately to introduce self, role of CM, and to gather background information. Native Cuban speaking staff also present to interpret. The patient reports she is here on vacation staying with her sister for 15 days, and will then return to [place]. She reports that she feels increased depression since the passing of her spouse 5 months ago and is hopeful medication will assist her. The patient reports she has a PCP in [place] and knows where to go for continued OP therapy and psychiatry. The patient was pleasant and cooperative throughout the discussion, and signed an FELIPE for her sister, Kim .     Admission / Details:   The patient is currently admitted under a 201 status from [facility] due to increased depression and passive SI since the passing of her spouse 5 months ago.     Residence: [address] [place] [address] (temporary)  Lives with: Currently on vacation at her sister's from [place]  County: [facility]  Commitment Status: 201  Insurance: Medicare Advantage  Rx coverage: Medicare Advantage  Pharmacy:  [facility]  Marital Status:   Children: None  Support System: Mother, sister  Level of Ed: HS Diploma, [employer] course   Work History: Retired    Income/Source: SSI  Cheondoism: Christian  Transportation: Has license and car  Legal Issues: Denies  MH Treatment Hx: Reports IP treatment 2 years ago in [place] for depression  Past Suicide Attempt: Denies  Current SI/HI: Denies  Trauma Hx: Recent death of spouse  Family Hx: Denies  D&A Hx: Denies  Medical: hypothyroidism   DME: None  Tobacco: reports 1/2 pack per day    Hx: Denies  Access to firearms: Denies  UDS Results: negative  PCP: patient reports she is active with her PCP in [facility]  [place]  Psych: will pursue in [place]  Therapist:will pursue in [place]  Stressors: the recent passing of her spouse, caring for her ill mother  Strengths:  family support  Coping Skills: unknown  ROIs Signed: Kim Mendez, sister, [phone]  Treatment Plan Signed: In Process  IMM Signed: Yes      Additional/Collateral Information:   N/A

## 2024-01-30 NOTE — ED PROVIDER NOTES
"History  Chief Complaint   Patient presents with    Back Pain     Pt reports dizziness, bilateral lung pain and lower back pain for the past couple of days.    Depression     Pt reports recently losing her  is looking for Crisis resources.      HPI  Ramandeep Abel is a 68 y.o. female with PMH sciatica, hypothyroidism who presents to the emergency department with depression and multiple complaints.  Patient reports since her   approximately 5 months ago she has had worsening depression.  She lived in [place] but family brought her to the [place] last week because of her worsening depression.  Family states that patient has been telling them that she wants to die.  Patient states she has been having increased drinking and smoking over this timeframe, and states that she wishes she were dead and has a vague plan to hurt herself by drinking and smoking.  She denies HI.  She also reports continued chronic lower back pain, she denies fevers, trauma, weakness, numbness, bowel/bladder incontinence or retention.  She also reports \" bilateral lung pain\" but denies chest pain or shortness of breath.  She has also had dysuria.  Patient has not had alcohol in the past week.    None       Past Medical History:   Diagnosis Date    Blood clot in vein     Depression     Disease of thyroid gland     Hypertension        History reviewed. No pertinent surgical history.    History reviewed. No pertinent family history.  I have reviewed and agree with the history as documented.    E-Cigarette/Vaping     E-Cigarette/Vaping Substances     Social History     Tobacco Use    Smoking status: Every Day     Types: Cigarettes    Smokeless tobacco: Never       Home medications:  None     Allergies:  No Known Allergies     Review of Systems   Constitutional:  Negative for fever.   Respiratory:  Negative for shortness of breath.    Cardiovascular:  Negative for chest pain.   Gastrointestinal:  Negative for " abdominal pain and vomiting.   Genitourinary:  Positive for dysuria.   Musculoskeletal:  Positive for back pain.   Neurological:  Negative for weakness and numbness.   All other systems reviewed and are negative.      Physical Exam  ED Triage Vitals [01/29/24 1016]   Temperature Pulse Respirations Blood Pressure SpO2   97.8 °F (36.6 °C) 89 20 (!) 174/86 95 %      Temp Source Heart Rate Source Patient Position - Orthostatic VS BP Location FiO2 (%)   Temporal Monitor Sitting Right arm --      Pain Score       --             Orthostatic Vital Signs  Vitals:    01/29/24 1016 01/29/24 1840   BP: (!) 174/86 140/64   Pulse: 89 87   Patient Position - Orthostatic VS: Sitting        Physical Exam  Vitals and nursing note reviewed.   Constitutional:       General: She is not in acute distress.     Appearance: She is not toxic-appearing.   HENT:      Head: Normocephalic.      Mouth/Throat:      Mouth: Mucous membranes are moist.   Eyes:      Pupils: Pupils are equal, round, and reactive to light.   Cardiovascular:      Rate and Rhythm: Normal rate and regular rhythm.   Pulmonary:      Effort: Pulmonary effort is normal. No respiratory distress.      Breath sounds: No wheezing, rhonchi or rales.   Abdominal:      General: Abdomen is flat. There is no distension.      Palpations: Abdomen is soft.      Tenderness: There is no abdominal tenderness. There is no guarding or rebound.   Musculoskeletal:      Comments: No midline C, T, or L-spine tenderness.   Skin:     General: Skin is warm and dry.   Neurological:      Mental Status: She is alert.      Sensory: No sensory deficit.      Motor: No weakness.         ED Medications  Medications   cephalexin (KEFLEX) capsule 500 mg (500 mg Oral Given 1/29/24 1841)   losartan (COZAAR) tablet 25 mg (has no administration in time range)   atorvastatin (LIPITOR) tablet 20 mg (20 mg Oral Given 1/29/24 1841)   levothyroxine tablet 150 mcg (has no administration in time range)   traMADol  (ULTRAM) tablet 50 mg (50 mg Oral Given 1/29/24 1841)   acetaminophen (TYLENOL) tablet 650 mg (has no administration in time range)   polyethylene glycol (MIRALAX) packet 17 g (17 g Oral Given 1/29/24 1900)   clopidogrel (PLAVIX) tablet 75 mg (75 mg Oral Given 1/29/24 1841)       Diagnostic Studies  Results Reviewed       Procedure Component Value Units Date/Time    Rapid drug screen, urine [[id number]]  (Normal) Collected: 01/29/24 1306    Lab Status: Final result Specimen: Urine, Clean Catch Updated: 01/29/24 1559     Amph/Meth UR Negative     Barbiturate Ur Negative     Benzodiazepine Urine Negative     Cocaine Urine Negative     Methadone Urine Negative     Opiate Urine Negative     PCP Ur Negative     THC Urine Negative     Oxycodone Urine Negative    Narrative:      FOR MEDICAL PURPOSES ONLY.   IF CONFIRMATION NEEDED PLEASE CONTACT THE LAB WITHIN 5 DAYS.    Drug Screen Cutoff Levels:  AMPHETAMINE/METHAMPHETAMINES  1000 ng/mL  BARBITURATES     200 ng/mL  BENZODIAZEPINES     200 ng/mL  COCAINE      300 ng/mL  METHADONE      300 ng/mL  OPIATES      300 ng/mL  PHENCYCLIDINE     25 ng/mL  THC       50 ng/mL  OXYCODONE      100 ng/mL    Urine Microscopic [[id number]]  (Abnormal) Collected: 01/29/24 1521    Lab Status: Final result Specimen: Urine, Clean Catch Updated: 01/29/24 1554     RBC, UA None Seen /hpf      WBC, UA 4-10 /hpf      Epithelial Cells Occasional /hpf      Bacteria, UA Occasional /hpf      MUCUS THREADS Occasional    Urine Macroscopic, POC [[id number]]  (Abnormal) Collected: 01/29/24 1521    Lab Status: Final result Specimen: Urine Updated: 01/29/24 1522     Color, UA Yellow     Clarity, UA Clear     pH, UA 6.0     Leukocytes, UA Trace     Nitrite, UA Positive     Protein, UA Negative mg/dl      Glucose, UA Negative mg/dl      Ketones, UA Negative mg/dl      Urobilinogen, UA 0.2 E.U./dl      Bilirubin, UA Negative     Occult Blood, UA Trace     Specific Gravity, UA 1.025    Narrative:      CLINITEK  RESULT    RBC Morphology Reflex Test [[id number]] Collected: 01/29/24 1201    Lab Status: Final result Specimen: Blood from Arm, Right Updated: 01/29/24 1401    UA w Reflex to Microscopic w Reflex to Culture [[id number]]     Lab Status: No result Specimen: Urine     CBC and differential [[id number]]  (Abnormal) Collected: 01/29/24 1201    Lab Status: Final result Specimen: Blood from Arm, Right Updated: 01/29/24 1328     WBC 6.71 Thousand/uL      RBC 4.57 Million/uL      Hemoglobin 15.0 g/dL      Hematocrit 46.6 %       fL      MCH 32.8 pg      MCHC 32.2 g/dL      RDW 14.0 %      MPV 9.5 fL      Platelets 264 Thousands/uL     Narrative:      This is an appended report.  These results have been appended to a previously verified report.    Manual Differential(PHLEBS Do Not Order) [[id number]]  (Abnormal) Collected: 01/29/24 1201    Lab Status: Final result Specimen: Blood from Arm, Right Updated: 01/29/24 1328     Segmented % 86 %      Lymphocytes % 11 %      Monocytes % 3 %      Eosinophils, % 0 %      Basophils % 0 %      Absolute Neutrophils 5.77 Thousand/uL      Lymphocytes Absolute 0.74 Thousand/uL      Monocytes Absolute 0.20 Thousand/uL      Eosinophils Absolute 0.00 Thousand/uL      Basophils Absolute 0.00 Thousand/uL      Total Counted --     RBC Morphology Present     Platelet Estimate Adequate     Macrocytes Present    TSH [[id number]]  (Normal) Collected: 01/29/24 1201    Lab Status: Final result Specimen: Blood from Arm, Right Updated: 01/29/24 1256     TSH 3RD GENERATON 3.281 uIU/mL     POCT alcohol breath test [[id number]]  (Normal) Resulted: 01/29/24 1239    Lab Status: Final result Updated: 01/29/24 1239     EXTBreath Alcohol 0.000    D-Dimer [[id number]]  (Normal) Collected: 01/29/24 1201    Lab Status: Final result Specimen: Blood from Arm, Right Updated: 01/29/24 1237     D-Dimer, Quant 0.33 ug/ml FEU     Narrative:      In the evaluation for possible pulmonary embolism, in the appropriate (Well's  Score of 4 or less) patient, the age adjusted d-dimer cutoff for this patient can be calculated as:    Age x 0.01 (in ug/mL) for Age-adjusted D-dimer exclusion threshold for a patient over 50 years.    Comprehensive metabolic panel [[id number]]  (Abnormal) Collected: 01/29/24 1201    Lab Status: Final result Specimen: Blood from Arm, Right Updated: 01/29/24 1236     Sodium 136 mmol/L      Potassium 4.1 mmol/L      Chloride 103 mmol/L      CO2 28 mmol/L      ANION GAP 5 mmol/L      BUN 10 mg/dL      Creatinine 0.54 mg/dL      Glucose 104 mg/dL      Calcium 9.5 mg/dL      AST 13 U/L      ALT 10 U/L      Alkaline Phosphatase 81 U/L      Total Protein 6.6 g/dL      Albumin 3.7 g/dL      Total Bilirubin 0.71 mg/dL      eGFR 97 ml/min/1.73sq m     Narrative:      National Kidney Disease Foundation guidelines for Chronic Kidney Disease (CKD):     Stage 1 with normal or high GFR (GFR > 90 mL/min/1.73 square meters)    Stage 2 Mild CKD (GFR = 60-89 mL/min/1.73 square meters)    Stage 3A Moderate CKD (GFR = 45-59 mL/min/1.73 square meters)    Stage 3B Moderate CKD (GFR = 30-44 mL/min/1.73 square meters)    Stage 4 Severe CKD (GFR = 15-29 mL/min/1.73 square meters)    Stage 5 End Stage CKD (GFR <15 mL/min/1.73 square meters)  Note: GFR calculation is accurate only with a steady state creatinine                   XR chest 2 views    (Results Pending)         Procedures  Procedures      ED Course                                       MDM  Medical Decision Making  Amount and/or Complexity of Data Reviewed  Labs: ordered.  Radiology: ordered.    Risk  OTC drugs.  Prescription drug management.  Decision regarding hospitalization.      Ramandeep Abel is a 68 y.o. female who presents to the emergency department with depression. Workup including vital signs, physical exam, EKG, CXR, labs, and urinalysis.  UA consistent with UTI, plan for treatment course of Keflex.  Patient with depression with SI, interested in  voluntary psychiatric admission, signed 201.  Patient medically cleared for psychiatry.      Disposition  Final diagnoses:   UTI (urinary tract infection)   Depression     Time reflects when diagnosis was documented in both MDM as applicable and the Disposition within this note       Time User Action Codes Description Comment    1/29/2024  5:01 PM Mauro Maloney Add [N39.0] UTI (urinary tract infection)     1/29/2024  5:01 PM Mauro Maloney Add [F32.A] Depression     1/29/2024  5:02 PM Mauro Maloney Modify [N39.0] UTI (urinary tract infection)     1/29/2024  5:02 PM Mauro Maloney Modify [F32.A] Depression           ED Disposition       ED Disposition   Transfer to Behavioral Health Condition   --    Date/Time   Mon Jan 29, 2024  5:02 PM    Comment   Ramandeep Abel should be transferred out to behavioral health and has been medically cleared.               MD Documentation      Flowsheet Row Most Recent Value   Patient Condition The patient has been stabilized such that within reasonable medical probability, no material deterioration of the patient condition or the condition of the unborn child(quinn) is likely to result from the transfer   Reason for Transfer Level of Care needed not available at this facility   Benefits of Transfer Specialized equipment and/or services available at the receiving facility (Include comment)________________________  [Behavioral health]   Risks of Transfer Potential for delay in receiving treatment, Potential deterioration of medical condition, Increased discomfort during transfer   Accepting Physician Dr. Mcgrath   Accepting Facility Name, City & State  [address], [place]    (Name & Tel number) [facility],  [phone]   Sending MD Dr. MARVA Maloney   Provider Certification General risk, such as traffic hazards, adverse weather conditions, rough terrain or turbulence, possible failure of equipment  "(including vehicle or aircraft), or consequences of actions of persons outside the control of the transport personnel, Unanticipated needs of medical equipment and personnel during transport, Risk of worsening condition, The possibility of a transport vehicle being unavailable          RN Documentation      Flowsheet Row Most Recent Value   Accepting Facility Name, City & State  [facility], [place]    (Name & Tel number) [facility],  [phone]          Follow-up Information    None         Patient's Medications    No medications on file       No discharge procedures on file.    PDMP Review       None             ED Provider  Attending physically available and evaluated Ramandeep rick La Cruz. I managed the patient along with the ED Attending.    Electronically Signed by    Portions of the record may have been created with voice recognition software.  Occasional wrong word or \"sound a like\" substitutions may have occurred due to the inherent limitations of voice recognition software.  Read the chart carefully and recognize, using context, where substitutions have occurred       Mauro Maloney MD  01/29/24 2007    "

## 2024-01-30 NOTE — TREATMENT TEAM
01/30/24 0826   Team Meeting   Meeting Type Daily Rounds   Initial Conference Date 01/30/24   Team Members Present   Team Members Present Physician;Nurse;;;Occupational Therapist   Physician Team Member Laurita Estrada   Nursing Team Member Cherelle Machado   Care Management Team Member Halima   Social Work Team Member Theresa RUCKER Team Member Kj   Patient/Family Present   Patient Present No   Patient's Family Present No   Pharmacy: [facility]    New 201 admit from last night due to increased depression and passive SI. Patient lost spouse 5 months ago. Patient lives at her sister's home and came from [place] 1 week ago. Patient is cooperative with care and medication compliant. No discharge date pending at this time.

## 2024-01-30 NOTE — TREATMENT PLAN
TREATMENT PLAN REVIEW - Behavioral Health Ramandeep Abel 68 y.o. 1955 female MRN: [id number]    [facility] 6B [facility] Room / Bed: [facility] 643/[facility] 643-01 Encounter: [id number]          Admit Date/Time:  1/30/2024 12:24 AM    Treatment Team:   MD Diana Devlin, NINA Horta, DOMINIQUE Juarez, MIRIAM Cristina, SW  Nae Beavers    Diagnosis: Principal Problem:    Major depressive disorder, recurrent episode (HCC)  Active Problems:    Medical clearance for psychiatric admission    Acquired hypothyroidism    Tobacco abuse    Alcohol abuse    Essential hypertension    Mixed hyperlipidemia    Constipation      Patient Strengths/Assets: ability for insight, capable of independent living, cooperative, communication skills, motivation for treatment/growth, negotiates basic needs, patient is on a voluntary commitment, patient is willing to work on problems, reasoning ability, supportive family/friends    Patient Barriers/Limitations: difficulty adapting, poor self-care, recent losses    Short Term Goals: decrease in depressive symptoms, decrease in anxiety symptoms, decrease in suicidal thoughts, ability to stay safe on the unit, ability to stay free of restraints, improvement in ability to express basic needs, improvement in insight, improvement in reasoning ability, improvement in self care, sleep improvement, improvement in appetite, mood stabilization, increase in group attendance, increase in socialization with peers on the unit, acceptance of need for psychiatric treatment, acceptance of psychiatric medications    Long Term Goals: improvement in depression, improvement in anxiety, free of suicidal thoughts, free of homicidal thoughts, no self abusive behavior, improvement in reasoning ability, improved insight, able to express basic  needs, acceptance of need for psychiatric medications, acceptance of need for psychiatric treatment, acceptance of need for psychiatric follow up after discharge, acceptance of psychiatric diagnosis, adequate self care, adequate sleep, adequate appetite, appropriate interaction with peers, appropriate interaction with family, stable living arrangements upon discharge, establishment of family support upon discharge    Progress Towards Goals: starting psychiatric medications as prescribed, progressing, participates in milieu therapy, no longer suicidal    Recommended Treatment: medication management, patient medication education, group therapy, milieu therapy, continued Behavioral Health psychiatric evaluation/assessment process    Treatment Frequency: daily medication monitoring, group and milieu therapy daily, monitoring through interdisciplinary rounds, monitoring through weekly patient care conferences    Expected Discharge Date:  2/08/24    Discharge Plan: discharge to home, referral for outpatient medication management with a psychiatrist, referral for outpatient psychotherapy    Treatment Plan Created/Updated By: Garrison Theodore MD

## 2024-01-31 PROCEDURE — 99232 SBSQ HOSP IP/OBS MODERATE 35: CPT | Performed by: STUDENT IN AN ORGANIZED HEALTH CARE EDUCATION/TRAINING PROGRAM

## 2024-01-31 RX ORDER — POLYETHYLENE GLYCOL 3350 17 G/17G
17 POWDER, FOR SOLUTION ORAL DAILY
Status: DISCONTINUED | OUTPATIENT
Start: 2024-01-31 | End: 2024-02-06 | Stop reason: HOSPADM

## 2024-01-31 RX ORDER — FLUOXETINE 10 MG/1
10 CAPSULE ORAL DAILY
Status: DISCONTINUED | OUTPATIENT
Start: 2024-01-31 | End: 2024-02-02

## 2024-01-31 RX ADMIN — CHOLECALCIFEROL TAB 25 MCG (1000 UNIT) 2000 UNITS: 25 TAB at 08:15

## 2024-01-31 RX ADMIN — DOCUSATE SODIUM 100 MG: 100 CAPSULE, LIQUID FILLED ORAL at 17:20

## 2024-01-31 RX ADMIN — CLOPIDOGREL BISULFATE 75 MG: 75 TABLET ORAL at 08:14

## 2024-01-31 RX ADMIN — DOCUSATE SODIUM 100 MG: 100 CAPSULE, LIQUID FILLED ORAL at 08:15

## 2024-01-31 RX ADMIN — CYANOCOBALAMIN TAB 1000 MCG 1000 MCG: 1000 TAB at 08:14

## 2024-01-31 RX ADMIN — SENNOSIDES 17.2 MG: 8.6 TABLET, FILM COATED ORAL at 21:26

## 2024-01-31 RX ADMIN — POLYETHYLENE GLYCOL 3350 17 G: 17 POWDER, FOR SOLUTION ORAL at 08:41

## 2024-01-31 RX ADMIN — LOSARTAN POTASSIUM 25 MG: 25 TABLET, FILM COATED ORAL at 08:15

## 2024-01-31 RX ADMIN — ATORVASTATIN CALCIUM 20 MG: 20 TABLET, FILM COATED ORAL at 17:20

## 2024-01-31 RX ADMIN — LEVOTHYROXINE SODIUM 150 MCG: 150 TABLET ORAL at 06:18

## 2024-01-31 RX ADMIN — FLUOXETINE 10 MG: 10 CAPSULE ORAL at 14:02

## 2024-01-31 RX ADMIN — NICOTINE 1 PATCH: 14 PATCH, EXTENDED RELEASE TRANSDERMAL at 08:17

## 2024-01-31 RX ADMIN — MELATONIN TAB 3 MG 3 MG: 3 TAB at 21:26

## 2024-01-31 RX ADMIN — MAGNESIUM HYDROXIDE 30 ML: 400 SUSPENSION ORAL at 13:00

## 2024-01-31 NOTE — TREATMENT TEAM
01/31/24 0801   Team Meeting   Meeting Type Daily Rounds   Initial Conference Date 01/31/24   Team Members Present   Team Members Present Physician;Nurse;;;Occupational Therapist   Physician Team Member Laurita Estrada   Nursing Team Member Cherelle Machaod   Care Management Team Member Halima   Social Work Team Member Theresa   OT Team Member jK   Patient/Family Present   Patient Present No   Patient's Family Present No     Patient is mostly isolative to room and withdrawn. She is pleasant, cooperative with care, and medication compliant. Patient continues to endorse depression and passive SI. Patient started on Prozac 10mg today. Potential discharge to home on Tuesday 2/6/24.

## 2024-01-31 NOTE — PLAN OF CARE
Pt attends group and participates.    Problem: Alteration in Thoughts and Perception  Goal: Attend and participate in unit activities, including therapeutic, recreational, and educational groups  Description: Interventions:  -Encourage Visitation and family involvement in care  Outcome: Progressing

## 2024-01-31 NOTE — NURSING NOTE
"Patient AA&O. Visible on unit, calm and cooperative with care. Admits to depression. Denies SI, plan, or A/V/H.    C/O stomach \"discomfort\" r/t constipation. Last BM reported 1/26. Patient given scheduled Colace, Miralax with prune juice, and new order of PRN Milk of Mag. Provider notified. See new orders.      "

## 2024-01-31 NOTE — NURSING NOTE
Patient was withdrawn to her room most of the shift but out for dinner/HS snack. Endorses passive SI with no plan but contract for safety while on the unit, denies all other psych s/s. No behaviors noted. C/o constipation, had scheduled colace x 2, senokot x 1 and prune juice x 2, no result yet. Had 25% for dinner. Took her medications. Safety checks ongoing.

## 2024-01-31 NOTE — PROGRESS NOTES
01/31/24 1415   Team Meeting   Meeting Type Tx Team Meeting   Initial Conference Date 01/31/24   Next Conference Date 03/01/24   Team Members Present   Team Members Present Physician;Nurse;   Physician Team Member Dr. Theodore   Nursing Team Member Cherelle   Care Management Team Member Hlaima   Patient/Family Present   Patient Present Yes   Patient's Family Present No     Treatment plan reviewed with the patient; patient in agreement and signed the treatment plan. A copy of the treatment plan was put into the patient's discharge folder and the original was put in for scanning.

## 2024-01-31 NOTE — PLAN OF CARE
Problem: DEPRESSION  Goal: Will be euthymic at discharge  Description: INTERVENTIONS:  - Administer medication as ordered  - Provide emotional support via 1:1 interaction with staff  - Encourage involvement in milieu/groups/activities  - Monitor for social isolation  Outcome: Progressing     Problem: ANXIETY  Goal: Will report anxiety at manageable levels  Description: INTERVENTIONS:  - Administer medication as ordered  - Teach and encourage coping skills  - Provide emotional support  - Assess patient/family for anxiety and ability to cope  Outcome: Progressing     Problem: ANXIETY  Goal: By discharge: Patient will verbalize 2 strategies to deal with anxiety  Description: Interventions:  - Identify any obvious source/trigger to anxiety  - Staff will assist patient in applying identified coping technique/skills  - Encourage attendance of scheduled groups and activities  Outcome: Progressing

## 2024-01-31 NOTE — TREATMENT TEAM
Pt attended am group.  Pt bright and alert.  Pt primarily Mongolian speaking only and unable to understand peers. Pt able to identify positive coping skills and things she enjoys.      01/31/24 1000   Activity/Group Checklist   Group Community meeting   Attendance Attended   Attendance Duration (min) 31-45   Interactions Interacted appropriately   Affect/Mood Bright   Goals Achieved Identified feelings;Identified relapse prevention strategies;Discussed coping strategies;Able to listen to others;Able to engage in interactions;Able to reflect/comment on own behavior;Able to manage/cope with feelings;Verbalized increased hopefulness;Able to self-disclose;Able to recieve feedback

## 2024-01-31 NOTE — PROGRESS NOTES
"Progress Note - Behavioral Health   Ramandeep Peñalozadonado [facility] 68 y.o. female MRN: [id number]  Unit/Bed#: [facility] 643-01 Encounter: [id number]    Assessment/Plan   Principal Problem:    Major depressive disorder, recurrent episode (HCC)  Active Problems:    Medical clearance for psychiatric admission    Acquired hypothyroidism    Tobacco abuse    Alcohol abuse    Essential hypertension    Mixed hyperlipidemia    Constipation      Recommended Treatment:      No psychopharmacologic changes necessary at this time; will continue to assess for further optimization.  Continue with current medications:  Prozac 10mg oral Daily for depression and anxiety symptoms (patient to start this medication today)  Continue with group therapy, milieu therapy and occupational therapy.    Continue frequent safety checks and vitals per unit protocol.  Continue with [facility] medical management as indicated  Reached out to [facility] provider about patient's levothyroxine dose. Patient states that she takes levothyroxine 175mg at home but was started on 150mg here.  Continue coordinating with case management regarding disposition    Legal Status: 201  Disposition: To be determined, coordinating with case management    Case discussed with treatment team.  Risks, benefits and possible side effects of Medications: Risks, benefits, and possible side effects of medications have been explained to the patient, who verbalizes understanding    ------------------------------------------------------------    Subjective: Patient's chart was reviewed, and patient's progress and plan was discussed with treatment team. Per nursing report, Ramandeep has been calm, pleasant and cooperative on the unit. Per report, patient has been mostly isolative to their room but comes out for meals and snacks.    Ramandeep was evaluated this morning for continuity of care. On examination, Ramandeep is calm, pleasant and cooperative. She states her mood is \"good\" and is " "\"better than yesterday\". She reports sleeping well last night, however, her energy level today is low. Her appetite has been low and reports not eating breakfast and is not hungry for lunch. Patient states that her lack of appetite is due to constipation. She denies adverse effects from home medications. Patient did not receive Prozac yesterday but will receive her first dose today. She currently denies suicidal ideation, homicidal ideation, auditory hallucinations, and visual hallucinations. Her goals for today are continue mood improvement.    VS: Reviewed, within normal limits    Progress Toward Goals: Depression improvement    Psychiatric Review of Systems:  Behavior over the last 24 hours: unchanged  Sleep: normal  Appetite:  low appetite  Medication side effects:  Will start Prozac today  Medical ROS: Constipation, dizziness and mild SOB. ROS is negative unless otherwise stated    Vital signs in last 24 hours:  Temp:  [97.2 °F (36.2 °C)-98 °F (36.7 °C)] 97.2 °F (36.2 °C)  HR:  [72-89] 80  Resp:  [16] 16  BP: (128-155)/(67-76) 155/69    Mental Status Exam:    Appearance:  alert, good eye contact, appears stated age, casually dressed, and appropriate grooming and hygiene   Behavior:  calm, cooperative, and pleasant    Speech:  spontaneous and coherent   Mood:  \"Good\", \"better than yesterday\"   Affect:  normal range and intensity   Thought Process:  Organized, logical, goal-directed   Associations: intact associations   Thought Content:  no verbalized delusions or overt paranoia   Perceptual Disturbances: no reported hallucinations and does not appear to be responding to internal stimuli at this time   Risk Potential: Suicidal ideation - None at present  Homicidal ideation - None  Potential for aggression - No   Sensorium:  oriented to person, place, and time/date   Memory:  recent and remote memory grossly intact   Consciousness:  alert and awake   Attention/Concentration: attention span and concentration are " age appropriate   Insight:  limited   Judgment: limited   Gait/Station: normal gait/station   Motor Activity: no abnormal movements     Current Medications:  Current Facility-Administered Medications   Medication Dose Route Frequency Provider Last Rate    aluminum-magnesium hydroxide-simethicone  30 mL Oral Q4H PRN Carlotta Mcgrath MD      atorvastatin  20 mg Oral Daily With Dinner Diana Moreau, MIRIAM      cholecalciferol  2,000 Units Oral Daily Diana Moreau, CRHARINDER      clopidogrel  75 mg Oral Daily Diana Moreau, CRHARINDER      cyanocobalamin  1,000 mcg Oral Daily Diana Moreau, CRNP      docusate sodium  100 mg Oral BID Diana Moreau, MIRIAM      haloperidol lactate  5 mg Intramuscular Q4H PRN Max 4/day Carlotta Mcgrath MD      hydrOXYzine HCL  25 mg Oral Q6H PRN Max 4/day Carlotta Mcgrath MD      levothyroxine  150 mcg Oral Early Morning Diana Moreau, MIRIAM      LORazepam  1 mg Intramuscular Q6H PRN Max 3/day Carlotta Mcgrath MD      losartan  25 mg Oral Daily Diana Moreau, MIRIAM      melatonin  3 mg Oral HS Carlotta Mcgrath MD      nicotine  1 patch Transdermal Daily Diana Moreau, MIRIAM      nicotine polacrilex  4 mg Oral Q2H PRN Carlotta Mcgrath MD      polyethylene glycol  17 g Oral Daily PRN Diana Moreau, MIRIAM      risperiDONE  0.25 mg Oral Q4H PRN Max 6/day Carlotta Mcgrath MD      risperiDONE  0.5 mg Oral Q4H PRN Max 3/day Carlotta Mcgrath MD      risperiDONE  1 mg Oral Q2H PRN Max 3/day Carlotta Mcgrath MD      senna  2 tablet Oral HS Diana Moreau, MIRIAM         Behavioral Health Medications: all current active meds have been reviewed. Changes as in plan section above.    Laboratory results:  I have personally reviewed all pertinent laboratory/tests results.   Recent Results (from the past 48 hour(s))   CBC and differential    Collection Time: 01/29/24 12:01 PM   Result Value Ref Range    WBC 6.71 4.31 - 10.16 Thousand/uL    RBC 4.57 3.81 -  5.12 Million/uL    Hemoglobin 15.0 11.5 - 15.4 g/dL    Hematocrit 46.6 (H) 34.8 - 46.1 %     (H) 82 - 98 fL    MCH 32.8 26.8 - 34.3 pg    MCHC 32.2 31.4 - 37.4 g/dL    RDW 14.0 11.6 - 15.1 %    MPV 9.5 8.9 - 12.7 fL    Platelets 264 149 - 390 Thousands/uL   Comprehensive metabolic panel    Collection Time: 01/29/24 12:01 PM   Result Value Ref Range    Sodium 136 135 - 147 mmol/L    Potassium 4.1 3.5 - 5.3 mmol/L    Chloride 103 96 - 108 mmol/L    CO2 28 21 - 32 mmol/L    ANION GAP 5 mmol/L    BUN 10 5 - 25 mg/dL    Creatinine 0.54 (L) 0.60 - 1.30 mg/dL    Glucose 104 65 - 140 mg/dL    Calcium 9.5 8.4 - 10.2 mg/dL    AST 13 13 - 39 U/L    ALT 10 7 - 52 U/L    Alkaline Phosphatase 81 34 - 104 U/L    Total Protein 6.6 6.4 - 8.4 g/dL    Albumin 3.7 3.5 - 5.0 g/dL    Total Bilirubin 0.71 0.20 - 1.00 mg/dL    eGFR 97 ml/min/1.73sq m   TSH    Collection Time: 01/29/24 12:01 PM   Result Value Ref Range    TSH 3RD GENERATON 3.281 0.450 - 4.500 uIU/mL   D-Dimer    Collection Time: 01/29/24 12:01 PM   Result Value Ref Range    D-Dimer, Quant 0.33 <0.50 ug/ml FEU   Manual Differential(PHLEBS Do Not Order)    Collection Time: 01/29/24 12:01 PM   Result Value Ref Range    Segmented % 86 (H) 43 - 75 %    Lymphocytes % 11 (L) 14 - 44 %    Monocytes % 3 (L) 4 - 12 %    Eosinophils, % 0 0 - 6 %    Basophils % 0 0 - 1 %    Absolute Neutrophils 5.77 1.85 - 7.62 Thousand/uL    Lymphocytes Absolute 0.74 0.60 - 4.47 Thousand/uL    Monocytes Absolute 0.20 0.00 - 1.22 Thousand/uL    Eosinophils Absolute 0.00 0.00 - 0.40 Thousand/uL    Basophils Absolute 0.00 0.00 - 0.10 Thousand/uL    Total Counted      RBC Morphology Present     Platelet Estimate Adequate Adequate    Macrocytes Present    POCT alcohol breath test    Collection Time: 01/29/24 12:39 PM   Result Value Ref Range    EXTBreath Alcohol 0.000    Rapid drug screen, urine    Collection Time: 01/29/24  1:06 PM   Result Value Ref Range    Amph/Meth UR Negative Negative     Barbiturate Ur Negative Negative    Benzodiazepine Urine Negative Negative    Cocaine Urine Negative Negative    Methadone Urine Negative Negative    Opiate Urine Negative Negative    PCP Ur Negative Negative    THC Urine Negative Negative    Oxycodone Urine Negative Negative   Urine Macroscopic, POC    Collection Time: 01/29/24  3:21 PM   Result Value Ref Range    Color, UA Yellow     Clarity, UA Clear     pH, UA 6.0 4.5 - 8.0    Leukocytes, UA Trace (A) Negative    Nitrite, UA Positive (A) Negative    Protein, UA Negative Negative mg/dl    Glucose, UA Negative Negative mg/dl    Ketones, UA Negative Negative mg/dl    Urobilinogen, UA 0.2 0.2, 1.0 E.U./dl E.U./dl    Bilirubin, UA Negative Negative    Occult Blood, UA Trace (A) Negative    Specific Gravity, UA 1.025 1.003 - 1.030   Urine Microscopic    Collection Time: 01/29/24  3:21 PM   Result Value Ref Range    RBC, UA None Seen None Seen, 1-2 /hpf    WBC, UA 4-10 (A) None Seen, 1-2 /hpf    Epithelial Cells Occasional None Seen, Occasional /hpf    Bacteria, UA Occasional None Seen, Occasional /hpf    MUCUS THREADS Occasional (A) None Seen   UA w Reflex to Microscopic w Reflex to Culture    Collection Time: 01/29/24  3:22 PM    Specimen: Urine, Clean Catch   Result Value Ref Range    Color, UA Yellow     Clarity, UA Hazy     Specific Post, UA 1.017 1.003 - 1.030    pH, UA 6.0 4.5, 5.0, 5.5, 6.0, 6.5, 7.0, 7.5, 8.0    Leukocytes, UA Trace (A) Negative    Nitrite, UA Positive (A) Negative    Protein, UA Negative Negative mg/dl    Glucose, UA Negative Negative mg/dl    Ketones, UA Negative Negative mg/dl    Urobilinogen, UA <2.0 <2.0 mg/dl mg/dl    Bilirubin, UA Negative Negative    Occult Blood, UA Negative Negative   Urine Microscopic    Collection Time: 01/29/24  3:22 PM   Result Value Ref Range    RBC, UA None Seen None Seen, 1-2 /hpf    WBC, UA 2-4 (A) None Seen, 1-2 /hpf    Epithelial Cells Occasional None Seen, Occasional /hpf    Bacteria, UA Occasional None  Seen, Occasional /hpf    MUCUS THREADS Occasional (A) None Seen   Basic metabolic panel    Collection Time: 01/30/24  5:08 AM   Result Value Ref Range    Sodium 137 135 - 147 mmol/L    Potassium 3.9 3.5 - 5.3 mmol/L    Chloride 101 96 - 108 mmol/L    CO2 28 21 - 32 mmol/L    ANION GAP 8 mmol/L    BUN 9 5 - 25 mg/dL    Creatinine 0.51 (L) 0.60 - 1.30 mg/dL    Glucose 106 65 - 140 mg/dL    Glucose, Fasting 106 (H) 65 - 99 mg/dL    Calcium 9.0 8.4 - 10.2 mg/dL    eGFR 99 ml/min/1.73sq m   CBC and differential    Collection Time: 01/30/24  5:08 AM   Result Value Ref Range    WBC 4.60 4.31 - 10.16 Thousand/uL    RBC 4.44 3.81 - 5.12 Million/uL    Hemoglobin 14.1 11.5 - 15.4 g/dL    Hematocrit 44.0 34.8 - 46.1 %    MCV 99 (H) 82 - 98 fL    MCH 31.8 26.8 - 34.3 pg    MCHC 32.0 31.4 - 37.4 g/dL    RDW 13.9 11.6 - 15.1 %    MPV 9.8 8.9 - 12.7 fL    Platelets 236 149 - 390 Thousands/uL    nRBC 0 /100 WBCs    Neutrophils Relative 70 43 - 75 %    Immat GRANS % 0 0 - 2 %    Lymphocytes Relative 16 14 - 44 %    Monocytes Relative 11 4 - 12 %    Eosinophils Relative 2 0 - 6 %    Basophils Relative 1 0 - 1 %    Neutrophils Absolute 3.26 1.85 - 7.62 Thousands/µL    Immature Grans Absolute 0.01 0.00 - 0.20 Thousand/uL    Lymphocytes Absolute 0.73 0.60 - 4.47 Thousands/µL    Monocytes Absolute 0.49 0.17 - 1.22 Thousand/µL    Eosinophils Absolute 0.08 0.00 - 0.61 Thousand/µL    Basophils Absolute 0.03 0.00 - 0.10 Thousands/µL   Vitamin B12    Collection Time: 01/30/24  5:08 AM   Result Value Ref Range    Vitamin B-12 140 (L) 180 - 914 pg/mL   Folate    Collection Time: 01/30/24  5:08 AM   Result Value Ref Range    Folate 6.3 >5.9 ng/mL   Vitamin D 25 hydroxy    Collection Time: 01/30/24  5:08 AM   Result Value Ref Range    Vit D, 25-Hydroxy 24.1 (L) 30.0 - 100.0 ng/mL   Lipid panel    Collection Time: 01/30/24  5:08 AM   Result Value Ref Range    Cholesterol 110 See Comment mg/dL    Triglycerides 61 See Comment mg/dL    HDL, Direct 32  (L) >=50 mg/dL    LDL Calculated 66 0 - 100 mg/dL    Non-HDL-Chol (CHOL-HDL) 78 mg/dl   Hemoglobin A1C    Collection Time: 01/30/24  5:08 AM   Result Value Ref Range    Hemoglobin A1C 5.4 Normal 4.0-5.6%; PreDiabetic 5.7-6.4%; Diabetic >=6.5%; Glycemic control for adults with diabetes <7.0% %     mg/dl      Hubert Barbosa  MS3

## 2024-02-01 PROCEDURE — 99232 SBSQ HOSP IP/OBS MODERATE 35: CPT | Performed by: STUDENT IN AN ORGANIZED HEALTH CARE EDUCATION/TRAINING PROGRAM

## 2024-02-01 RX ORDER — MAGNESIUM CARB/ALUMINUM HYDROX 105-160MG
TABLET,CHEWABLE ORAL ONCE
Qty: 296 ML | Refills: 0 | Status: COMPLETED | OUTPATIENT
Start: 2024-02-01 | End: 2024-02-01

## 2024-02-01 RX ADMIN — MELATONIN TAB 3 MG 3 MG: 3 TAB at 21:34

## 2024-02-01 RX ADMIN — CYANOCOBALAMIN TAB 1000 MCG 1000 MCG: 1000 TAB at 08:10

## 2024-02-01 RX ADMIN — LOSARTAN POTASSIUM 25 MG: 25 TABLET, FILM COATED ORAL at 08:10

## 2024-02-01 RX ADMIN — CLOPIDOGREL BISULFATE 75 MG: 75 TABLET ORAL at 08:10

## 2024-02-01 RX ADMIN — CHOLECALCIFEROL TAB 25 MCG (1000 UNIT) 2000 UNITS: 25 TAB at 08:10

## 2024-02-01 RX ADMIN — NICOTINE 1 PATCH: 14 PATCH, EXTENDED RELEASE TRANSDERMAL at 08:20

## 2024-02-01 RX ADMIN — POLYETHYLENE GLYCOL 3350 17 G: 17 POWDER, FOR SOLUTION ORAL at 08:11

## 2024-02-01 RX ADMIN — MAGNESIUM CITRATE: 1.75 LIQUID ORAL at 13:29

## 2024-02-01 RX ADMIN — FLUOXETINE 10 MG: 10 CAPSULE ORAL at 08:10

## 2024-02-01 RX ADMIN — LEVOTHYROXINE SODIUM 175 MCG: 0.15 TABLET ORAL at 05:54

## 2024-02-01 RX ADMIN — DOCUSATE SODIUM 100 MG: 100 CAPSULE, LIQUID FILLED ORAL at 17:21

## 2024-02-01 RX ADMIN — ATORVASTATIN CALCIUM 20 MG: 20 TABLET, FILM COATED ORAL at 17:20

## 2024-02-01 RX ADMIN — DOCUSATE SODIUM 100 MG: 100 CAPSULE, LIQUID FILLED ORAL at 08:10

## 2024-02-01 NOTE — PROGRESS NOTES
"Progress Note - Behavioral Health   Ramandeep Erickson Mendez [facility] 68 y.o. female MRN: [id number]  Unit/Bed#: [facility] 643-01 Encounter: [id number]    Assessment/Plan   Principal Problem:    Major depressive disorder, recurrent episode (HCC)  Active Problems:    Medical clearance for psychiatric admission    Acquired hypothyroidism    Tobacco abuse    Alcohol abuse    Essential hypertension    Mixed hyperlipidemia    Constipation      Recommended Treatment:      No psychopharmacologic changes necessary at this time; will continue to assess for further optimization.  Continue with current medications:  Prozac 10mg oral daily for symptoms of depression/anxiety   Continue with group therapy, milieu therapy and occupational therapy.    Continue frequent safety checks and vitals per unit protocol.  Continue with SLIM medical management as indicated  Continue coordinating with case management regarding disposition    Legal Status: 201  Disposition: To be determined, coordinating with case management    Case discussed with treatment team.  Risks, benefits and possible side effects of Medications: Risks, benefits, and possible side effects of medications have been explained to the patient, who verbalizes understanding    ------------------------------------------------------------    Subjective: Patient's chart was reviewed, and patient's progress and plan was discussed with treatment team. Per nursing report, Ramandeep has been cooperative on the unit and adequately compliant with medications. Per report, patient has been mostly isolative to their room.    Ramandeep was evaluated this morning for continuity of care. On examination, Ramandeep is calm and doing well. She states her mood is \"good.\" She reports sleeping well last night and her energy level today is more or less better than yesterday. Her appetite has been poor due to feeling bloated from the constipation. She denies adverse effects from medications. She " "currently denies suicidal ideation, homicidal ideation, auditory hallucinations, and visual hallucinations. Her goals for today are for continued depression improvement.    VS: Reviewed, within normal limits    Progress Toward Goals: depression improvement    Psychiatric Review of Systems:  Behavior over the last 24 hours: unchanged  Sleep: normal  Appetite: Poor  Medication side effects: none verbalized  Medical ROS: constipation, Denies N/V, fever, cough, chest pain. ROS otherwise negative.    Vital signs in last 24 hours:  Temp:  [97.9 °F (36.6 °C)-98.1 °F (36.7 °C)] 97.9 °F (36.6 °C)  HR:  [83-93] 93  Resp:  [16-17] 16  BP: (111-157)/(57-98) 157/98    Mental Status Exam:    Appearance:  alert, good eye contact, appears stated age, casually dressed, and appropriate grooming and hygiene   Behavior:  calm and cooperative   Speech:  spontaneous and coherent   Mood:  \"Good\"   Affect:  normal range and intensity   Thought Process:  Organized, logical, goal-directed   Associations: intact associations   Thought Content:  no verbalized delusions or overt paranoia   Perceptual Disturbances: no reported hallucinations and does not appear to be responding to internal stimuli at this time   Risk Potential: Suicidal ideation - None at present  Homicidal ideation - None  Potential for aggression - No   Sensorium:  oriented to person, place, and time/date   Memory:  recent and remote memory grossly intact   Consciousness:  alert and awake   Attention/Concentration: attention span and concentration are age appropriate   Insight:  partial   Judgment: partial   Gait/Station: normal gait/station   Motor Activity: no abnormal movements     Current Medications:  Current Facility-Administered Medications   Medication Dose Route Frequency Provider Last Rate    aluminum-magnesium hydroxide-simethicone  30 mL Oral Q4H PRN Carlotta Mcgrath MD      atorvastatin  20 mg Oral Daily With Dinner MIRIAM Hayes      cholecalciferol  " 2,000 Units Oral Daily Diana Moreau, MIRIAM      clopidogrel  75 mg Oral Daily Diana Moreau, MIRIAM      cyanocobalamin  1,000 mcg Oral Daily Diana Moreau, MIRIAM      docusate sodium  100 mg Oral BID Diana Moreau, MIRIAM      FLUoxetine  10 mg Oral Daily Garrison Theodore MD      haloperidol lactate  5 mg Intramuscular Q4H PRN Max 4/day Carlotta Mcgrath MD      hydrOXYzine HCL  25 mg Oral Q6H PRN Max 4/day Carlotta Mcgrath MD      levothyroxine  175 mcg Oral Early Morning Katherine Miller, MIRIAM      LORazepam  1 mg Intramuscular Q6H PRN Max 3/day Carlotta Mcgrath MD      losartan  25 mg Oral Daily Diana Moreau, MIRIAM      magnesium hydroxide  30 mL Oral Daily PRN Katherine Miller, MIRIAM      melatonin  3 mg Oral HS Carlotta Mcgrath MD      nicotine  1 patch Transdermal Daily Diana Moreau, MIRIAM      nicotine polacrilex  4 mg Oral Q2H PRN Carlotta Mcgrath MD      polyethylene glycol  17 g Oral Daily PRN Diana Moreau, MIRIAM      polyethylene glycol  17 g Oral Daily Katherine Miller, MIRIAM      risperiDONE  0.25 mg Oral Q4H PRN Max 6/day Carlotta Mcgrath MD      risperiDONE  0.5 mg Oral Q4H PRN Max 3/day Carlotta Mcgrath MD      risperiDONE  1 mg Oral Q2H PRN Max 3/day Carlotta Mcgrath MD      senna  2 tablet Oral HS MIRIAM Hayes         Behavioral Health Medications: all current active meds have been reviewed. Changes as in plan section above.    Laboratory results:  I have personally reviewed all pertinent laboratory/tests results.   No results found for this or any previous visit (from the past 48 hour(s)).     Hubert Barbosa  MS3

## 2024-02-01 NOTE — TREATMENT TEAM
02/01/24 0738   Team Meeting   Meeting Type Daily Rounds   Initial Conference Date 02/01/24   Team Members Present   Team Members Present Physician;Nurse;;;Occupational Therapist   Physician Team Member Laurita Estrada   Nursing Team Member Gualberto Figueredo   Care Management Team Member Halima   Social Work Team Member Theresa   OT Team Member Kj   Patient/Family Present   Patient Present No   Patient's Family Present No     Patient is mostly isolative to room and withdrawn. She is pleasant, cooperative with care, and medication compliant. Patient continues to endorse depression. Prozac started yesterday, will increase tomorrow. Potential discharge to home on Tuesday 2/6/24.

## 2024-02-01 NOTE — NURSING NOTE
Pt withdrawn to room. Pt endorses depression but denies AH/VH/SI/HI. Pt reports constipation. Senna given at bedtime. Pt denies any unmet needs at this time. Continual rounding in place.

## 2024-02-01 NOTE — PLAN OF CARE
Problem: DEPRESSION  Goal: Will be euthymic at discharge  Description: INTERVENTIONS:  - Administer medication as ordered  - Provide emotional support via 1:1 interaction with staff  - Encourage involvement in milieu/groups/activities  - Monitor for social isolation  Outcome: Progressing     Problem: ANXIETY  Goal: Will report anxiety at manageable levels  Description: INTERVENTIONS:  - Administer medication as ordered  - Teach and encourage coping skills  - Provide emotional support  - Assess patient/family for anxiety and ability to cope  Outcome: Progressing     Problem: Alteration in Thoughts and Perception  Goal: Attend and participate in unit activities, including therapeutic, recreational, and educational groups  Description: Interventions:  -Encourage Visitation and family involvement in care  Outcome: Progressing

## 2024-02-01 NOTE — NURSING NOTE
"Patient AA&O x 4. Endorses anxiety and depression. Denies SI/HI/AH/VH. C/O \"discomfort\" in lower abdomen due to constipation. Patient continues to take scheduled Colace, Miralax and Senna. Magnesium citrate brought in by family, per patient request. Provider notified, see new order.  "

## 2024-02-02 LAB
BACTERIA UR QL AUTO: ABNORMAL /HPF
BILIRUB UR QL STRIP: NEGATIVE
CLARITY UR: ABNORMAL
COLOR UR: YELLOW
GLUCOSE UR STRIP-MCNC: NEGATIVE MG/DL
HGB UR QL STRIP.AUTO: 150
KETONES UR STRIP-MCNC: NEGATIVE MG/DL
LEUKOCYTE ESTERASE UR QL STRIP: 500
MUCOUS THREADS UR QL AUTO: ABNORMAL
NITRITE UR QL STRIP: NEGATIVE
NON-SQ EPI CELLS URNS QL MICRO: ABNORMAL /HPF
PH UR STRIP.AUTO: 6 [PH]
PROT UR STRIP-MCNC: ABNORMAL MG/DL
RBC #/AREA URNS AUTO: ABNORMAL /HPF
SP GR UR STRIP.AUTO: 1.01 (ref 1–1.04)
UROBILINOGEN UA: NEGATIVE MG/DL
WBC #/AREA URNS AUTO: ABNORMAL /HPF

## 2024-02-02 PROCEDURE — 99232 SBSQ HOSP IP/OBS MODERATE 35: CPT | Performed by: PSYCHIATRY & NEUROLOGY

## 2024-02-02 PROCEDURE — 81003 URINALYSIS AUTO W/O SCOPE: CPT | Performed by: NURSE PRACTITIONER

## 2024-02-02 PROCEDURE — 87077 CULTURE AEROBIC IDENTIFY: CPT | Performed by: NURSE PRACTITIONER

## 2024-02-02 PROCEDURE — 81001 URINALYSIS AUTO W/SCOPE: CPT | Performed by: NURSE PRACTITIONER

## 2024-02-02 PROCEDURE — 87086 URINE CULTURE/COLONY COUNT: CPT | Performed by: NURSE PRACTITIONER

## 2024-02-02 PROCEDURE — 87186 SC STD MICRODIL/AGAR DIL: CPT | Performed by: NURSE PRACTITIONER

## 2024-02-02 RX ORDER — CEFPODOXIME PROXETIL 200 MG/1
200 TABLET, FILM COATED ORAL 2 TIMES DAILY WITH MEALS
Status: DISCONTINUED | OUTPATIENT
Start: 2024-02-02 | End: 2024-02-06 | Stop reason: HOSPADM

## 2024-02-02 RX ORDER — FLUOXETINE HYDROCHLORIDE 20 MG/1
20 CAPSULE ORAL DAILY
Status: DISCONTINUED | OUTPATIENT
Start: 2024-02-03 | End: 2024-02-06 | Stop reason: HOSPADM

## 2024-02-02 RX ADMIN — LEVOTHYROXINE SODIUM 175 MCG: 0.15 TABLET ORAL at 05:48

## 2024-02-02 RX ADMIN — ATORVASTATIN CALCIUM 20 MG: 20 TABLET, FILM COATED ORAL at 17:40

## 2024-02-02 RX ADMIN — CEFPODOXIME PROXETIL 200 MG: 200 TABLET, FILM COATED ORAL at 17:40

## 2024-02-02 RX ADMIN — LOSARTAN POTASSIUM 25 MG: 25 TABLET, FILM COATED ORAL at 08:22

## 2024-02-02 RX ADMIN — POLYETHYLENE GLYCOL 3350 17 G: 17 POWDER, FOR SOLUTION ORAL at 08:20

## 2024-02-02 RX ADMIN — DOCUSATE SODIUM 100 MG: 100 CAPSULE, LIQUID FILLED ORAL at 08:22

## 2024-02-02 RX ADMIN — SENNOSIDES 17.2 MG: 8.6 TABLET, FILM COATED ORAL at 21:36

## 2024-02-02 RX ADMIN — NICOTINE 1 PATCH: 14 PATCH, EXTENDED RELEASE TRANSDERMAL at 08:23

## 2024-02-02 RX ADMIN — CHOLECALCIFEROL TAB 25 MCG (1000 UNIT) 2000 UNITS: 25 TAB at 08:23

## 2024-02-02 RX ADMIN — DOCUSATE SODIUM 100 MG: 100 CAPSULE, LIQUID FILLED ORAL at 17:40

## 2024-02-02 RX ADMIN — CYANOCOBALAMIN TAB 1000 MCG 1000 MCG: 1000 TAB at 08:22

## 2024-02-02 RX ADMIN — MELATONIN TAB 3 MG 3 MG: 3 TAB at 21:36

## 2024-02-02 RX ADMIN — FLUOXETINE 10 MG: 10 CAPSULE ORAL at 08:22

## 2024-02-02 RX ADMIN — CLOPIDOGREL BISULFATE 75 MG: 75 TABLET ORAL at 08:22

## 2024-02-02 NOTE — NURSING NOTE
"Patient AA&O. Denies anxiety and depression this morning. Denies SI/HI/AH/VH. Patient reported bleeding and \"burning\" sensation when voiding. Provider made aware. See new order for cefpodoxime.  "

## 2024-02-02 NOTE — TREATMENT TEAM
02/02/24 0741   Team Meeting   Meeting Type Daily Rounds   Initial Conference Date 02/02/24   Team Members Present   Team Members Present Physician;Nurse;;;Occupational Therapist   Physician Team Member Laurita PINEDA   Nursing Team Member Cherelle Figueredo   Care Management Team Member Halima   Social Work Team Member Theresa   OT Team Member Kj   Patient/Family Present   Patient Present No   Patient's Family Present No   Pharmacy: [facility]    Patient is mostly isolative to room and withdrawn. She is pleasant, cooperative with care, and medication compliant. Patient continues to endorse depression. Prozac increased today to 20mg. Potential discharge to home on Tuesday 2/6/24.

## 2024-02-02 NOTE — NURSING NOTE
Pt visible intermittently this shift. Pt pleasant. Pt reports anxiety and depression. Pt refused offered anxiolytic PRN. Pt denies all other symptoms. Pt reported having 4 loose stools today and refused HS Senna. Pt med/meal compliant. Pts BP read 176/76 on monitor at 2039. Pts BP reassessed manually and was 142/68. Pt currently resting in bed with even, unlabored respirations. Pt able to and encouraged to inform staff of any needs.

## 2024-02-02 NOTE — QUICK NOTE
Urinalysis with microscopic to urine culture.  Patient is having burning on urination with hematuria.

## 2024-02-02 NOTE — PROGRESS NOTES
"Progress Note - Behavioral Health   Ramandeep Erickson Mendez [facility] 68 y.o. female MRN: [id number]  Unit/Bed#: [facility] 643-01 Encounter: [id number]    Assessment/Plan   Principal Problem:    Major depressive disorder, recurrent episode (HCC)  Active Problems:    Medical clearance for psychiatric admission    Acquired hypothyroidism    Tobacco abuse    Alcohol abuse    Essential hypertension    Mixed hyperlipidemia    Constipation      Recommended Treatment:      Will make the following medication changes:  Continue with current medications:  Increasing Prozac to 20mg Daily for depression/anxiety symptoms   Continue with group therapy, milieu therapy and occupational therapy.    Continue frequent safety checks and vitals per unit protocol.  Continue with SLIM medical management as indicated  Continue coordinating with case management regarding disposition    Legal Status: 201  Disposition: coordinating with case management, Plan is for discharge on  2/6/2024    Case discussed with treatment team.  Risks, benefits and possible side effects of Medications: Risks, benefits, and possible side effects of medications have been explained to the patient, who verbalizes understanding    ------------------------------------------------------------    Subjective: Patient's chart was reviewed, and patient's progress and plan was discussed with treatment team. Per nursing report, Ramandeep has been calm and cooperative on the unit and adequately compliant with medications. Per report, patient has been mostly isolative to their room.    Ramandeep was evaluated this morning for continuity of care. On examination, Ramandeep is calm and pleasant. She states her mood is \"so so.\" She reports sleeping good last night and her energy level today is \"very good\". Her appetite has improved since she had a bowel movement yesterday. She denies adverse effects from medications. She currently denies suicidal ideation, homicidal ideation, " "auditory hallucinations, and visual hallucinations. Her goals for today are to continue depression symptom improvement.    Patient later expressed to attending physician that she was having \"lung pain\" and mild SOB. Patient is in no acute distress, and oxygen saturation has been WNL. Patient has history of COPD. Patient was encouraged to follow up outpatient physician once discharged. Patient is agreeable to this plan.     VS: Reviewed, within normal limits, expect for some hypertension in the 150s-170s systolic    Progress Toward Goals: depression improvement    Psychiatric Review of Systems:  Behavior over the last 24 hours: unchanged  Sleep: normal  Appetite: adequate, improving  Medication side effects: none verbalized  Medical ROS:  Reports \"lung pain\" and mild SOB. No fever, N/V/D, ROS is negative otherwise.    Vital signs in last 24 hours:  Temp:  [97.6 °F (36.4 °C)-97.8 °F (36.6 °C)] 97.8 °F (36.6 °C)  HR:  [77-97] 77  Resp:  [16-18] 18  BP: (133-176)/(68-76) 157/73    Mental Status Exam:    Appearance:  alert, good eye contact, appears stated age, casually dressed, and appropriate grooming and hygiene   Behavior:  calm and cooperative   Speech:  spontaneous and coherent   Mood:  \"So so\"   Affect:  normal range and intensity   Thought Process:  Organized, logical, goal-directed   Associations: intact associations   Thought Content:  no verbalized delusions or overt paranoia   Perceptual Disturbances: no reported hallucinations and does not appear to be responding to internal stimuli at this time   Risk Potential: Suicidal ideation - None at present  Homicidal ideation - None  Potential for aggression - No   Sensorium:  oriented to person, place, and time/date   Memory:  recent and remote memory grossly intact   Consciousness:  alert and awake   Attention/Concentration: attention span and concentration are age appropriate   Insight:  limited   Judgment: limited   Gait/Station: normal gait/station   Motor " Activity: no abnormal movements     Current Medications:  Current Facility-Administered Medications   Medication Dose Route Frequency Provider Last Rate    aluminum-magnesium hydroxide-simethicone  30 mL Oral Q4H PRN Carlotta Mcgrath MD      atorvastatin  20 mg Oral Daily With Dinner MIRIAM Hayes      cholecalciferol  2,000 Units Oral Daily MIRIAM Hayes      clopidogrel  75 mg Oral Daily MIRIAM Hayes      cyanocobalamin  1,000 mcg Oral Daily MIRIAM Hayes      docusate sodium  100 mg Oral BID MIRIAM Hayes      [START ON 2/3/2024] FLUoxetine  20 mg Oral Daily Carri Maharaj DO      haloperidol lactate  5 mg Intramuscular Q4H PRN Max 4/day Carlotta Mcgrath MD      hydrOXYzine HCL  25 mg Oral Q6H PRN Max 4/day Carlotta Mcgrath MD      levothyroxine  175 mcg Oral Early Morning MIRIAM Wray      LORazepam  1 mg Intramuscular Q6H PRN Max 3/day Carlotta Mcgrath MD      losartan  25 mg Oral Daily MIRIAM Hayes      magnesium hydroxide  30 mL Oral Daily PRN MIRIAM Wray      melatonin  3 mg Oral HS Carlotta Mcgrath MD      nicotine  1 patch Transdermal Daily MIIRAM Hayes      nicotine polacrilex  4 mg Oral Q2H PRN Carlotta Mcgrath MD      polyethylene glycol  17 g Oral Daily PRN MIRIAM Hayes      polyethylene glycol  17 g Oral Daily MIRIAM Wray      risperiDONE  0.25 mg Oral Q4H PRN Max 6/day Carlotta Mcgrath MD      risperiDONE  0.5 mg Oral Q4H PRN Max 3/day Carlotta Mcgrath MD      risperiDONE  1 mg Oral Q2H PRN Max 3/day Carlotta Mcgrath MD      senna  2 tablet Oral HS MIRIAM Hayes         Behavioral Health Medications: all current active meds have been reviewed. Changes as in plan section above.    Laboratory results:  I have personally reviewed all pertinent laboratory/tests results.   No results found for this or any previous visit (from the past  48 hour(s)).    Hubert Barbosa  MS3

## 2024-02-02 NOTE — PLAN OF CARE
Problem: SELF HARM/SUICIDALITY  Goal: Will have no self-injury during hospital stay  Description: INTERVENTIONS:  - Q 15 MINUTES: Routine safety checks  - Q WAKING SHIFT & PRN: Assess risk to determine if routine checks are adequate to maintain patient safety  - Encourage patient to participate actively in care by formulating a plan to combat response to suicidal ideation, identify supports and resources  Outcome: Progressing     Problem: ANXIETY  Goal: Will report anxiety at manageable levels  Description: INTERVENTIONS:  - Administer medication as ordered  - Teach and encourage coping skills  - Provide emotional support  - Assess patient/family for anxiety and ability to cope  Outcome: Progressing     Problem: Nutrition/Hydration-ADULT  Goal: Nutrient/Hydration intake appropriate for improving, restoring or maintaining nutritional needs  Description: Monitor and assess patient's nutrition/hydration status for malnutrition. Collaborate with interdisciplinary team and initiate plan and interventions as ordered.  Monitor patient's weight and dietary intake as ordered or per policy. Utilize nutrition screening tool and intervene as necessary. Determine patient's food preferences and provide high-protein, high-caloric foods as appropriate.     INTERVENTIONS:  - Monitor oral intake, urinary output, labs, and treatment plans  - Assess nutrition and hydration status and recommend course of action  - Evaluate amount of meals eaten  - Assist patient with eating if necessary   - Allow adequate time for meals  - Recommend/ encourage appropriate diets, oral nutritional supplements, and vitamin/mineral supplements  - Order, calculate, and assess calorie counts as needed  - Recommend, monitor, and adjust tube feedings and TPN/PPN based on assessed needs  - Assess need for intravenous fluids  - Provide specific nutrition/hydration education as appropriate  - Include patient/family/caregiver in decisions related to  nutrition  Outcome: Progressing

## 2024-02-02 NOTE — CASE MANAGEMENT
The patient remains calm and pleasant in all interactions with CM. The patient is potentially discharging home on Tuesday 2/6/24.

## 2024-02-02 NOTE — TREATMENT TEAM
Pt completed [facility] relapse prevention plan in [place].  Pt signed and scanned copy in chart.  Crisis warmline and [phone] numbers provided.      02/02/24 1430   Activity/Group Checklist   Group Admission/Discharge   Attendance Attended   Attendance Duration (min) 16-30   Interactions Interacted appropriately   Affect/Mood Appropriate   Goals Achieved Identified feelings;Identified relapse prevention strategies;Able to self-disclose;Able to listen to others;Able to engage in interactions;Identified resources and support systems;Identified distorted thoughts/beliefs;Discussed discharge plans;Discussed coping strategies

## 2024-02-03 PROCEDURE — 99232 SBSQ HOSP IP/OBS MODERATE 35: CPT | Performed by: STUDENT IN AN ORGANIZED HEALTH CARE EDUCATION/TRAINING PROGRAM

## 2024-02-03 RX ADMIN — CLOPIDOGREL BISULFATE 75 MG: 75 TABLET ORAL at 08:30

## 2024-02-03 RX ADMIN — CYANOCOBALAMIN TAB 1000 MCG 1000 MCG: 1000 TAB at 08:30

## 2024-02-03 RX ADMIN — MELATONIN TAB 3 MG 3 MG: 3 TAB at 21:27

## 2024-02-03 RX ADMIN — NICOTINE 1 PATCH: 14 PATCH, EXTENDED RELEASE TRANSDERMAL at 08:29

## 2024-02-03 RX ADMIN — SENNOSIDES 17.2 MG: 8.6 TABLET, FILM COATED ORAL at 21:27

## 2024-02-03 RX ADMIN — DOCUSATE SODIUM 100 MG: 100 CAPSULE, LIQUID FILLED ORAL at 17:04

## 2024-02-03 RX ADMIN — ATORVASTATIN CALCIUM 20 MG: 20 TABLET, FILM COATED ORAL at 15:34

## 2024-02-03 RX ADMIN — CEFPODOXIME PROXETIL 200 MG: 200 TABLET, FILM COATED ORAL at 07:55

## 2024-02-03 RX ADMIN — POLYETHYLENE GLYCOL 3350 17 G: 17 POWDER, FOR SOLUTION ORAL at 08:30

## 2024-02-03 RX ADMIN — DOCUSATE SODIUM 100 MG: 100 CAPSULE, LIQUID FILLED ORAL at 08:30

## 2024-02-03 RX ADMIN — FLUOXETINE HYDROCHLORIDE 20 MG: 20 CAPSULE ORAL at 08:37

## 2024-02-03 RX ADMIN — CEFPODOXIME PROXETIL 200 MG: 200 TABLET, FILM COATED ORAL at 15:34

## 2024-02-03 RX ADMIN — CHOLECALCIFEROL TAB 25 MCG (1000 UNIT) 2000 UNITS: 25 TAB at 08:30

## 2024-02-03 RX ADMIN — LOSARTAN POTASSIUM 25 MG: 25 TABLET, FILM COATED ORAL at 08:30

## 2024-02-03 RX ADMIN — LEVOTHYROXINE SODIUM 175 MCG: 0.15 TABLET ORAL at 06:21

## 2024-02-03 NOTE — PLAN OF CARE
Problem: ANXIETY  Goal: Will report anxiety at manageable levels  Description: INTERVENTIONS:  - Administer medication as ordered  - Teach and encourage coping skills  - Provide emotional support  - Assess patient/family for anxiety and ability to cope  Outcome: Progressing     Problem: Alteration in Thoughts and Perception  Goal: Attend and participate in unit activities, including therapeutic, recreational, and educational groups  Description: Interventions:  -Encourage Visitation and family involvement in care  Outcome: Progressing

## 2024-02-03 NOTE — NURSING NOTE
Patient is alert and oriented, withdrawn to room. Patient is pleasant on approach. Denies all psych s/s. Patient is medication compliant. No other issues or concerns this shift. Safety checks ongoing.

## 2024-02-03 NOTE — NURSING NOTE
Patient is medication and meal compliant. No complaints of pain or discomfort. Patient is visible on the unit but keeps to herself. Patient endorses both depression and anxiety. Patient denies SI, AH or VH. Will continue to monitor patient for changes in mood or behavior.

## 2024-02-03 NOTE — PROGRESS NOTES
"  Progress Note - Behavioral Health   Ramandeep Pappasssa [facility] 68 y.o. female MRN: [id number]  Unit/Bed#: [facility] 643-01 Encounter: [id number]       Patient was visited on unit for continuing care; chart reviewed and discussed with the nursing staff. On approach, the patient was calm and superficially cooperative.  The patient was somatically preoccupied and complained of pain in lower back and multiple joints; scored 7 out of 10 and noted that only tramadol helps with the pain.  Denied any changes in mood, appetite, and energy level. No problem initiating and maintaining sleep. Denied A/VH currently. Denied SI/HI, intent or plan upon direct inquiry at this time.    Patient remains visible in the milieu but mostly withdrawn to self.  No reports of aggression or self-injurious behavior on unit. No PRN medications used in the past 24 hours.    Patient accepted all offered medications and reported feeling better. No adverse effects of medications noted or reported.    Current Mental Status Evaluation:  Appearance and attitude: appeared as stated age, dressed in hospital attire, with good hygiene  Eye contact: good  Motor Function: within normal limits, intact gait, No PMA/PMR  Gait/station: normal gait/station and normal balance  Speech: normal for rate, rhythm, volume, latency, amount  Language: No overt abnormality  Mood/affect:  \"gurmeet\"  / Affect was constricted but reactive, mood congruent  Thought Processes: linear, concrete  Thought content: denied suicidal ideations or homicidal ideations, no overt delusions elicited, somatic preoccupation  Associations: concrete associations  Perceptual disturbances: denies Auditory/Visual/Tactile Hallucinations  Orientation: oriented to time, person, place and to the situational context  Cognitive Function: intact  Memory: not cooperative with formal MMSE  Intellect: unable to assess  Fund of knowledge: aware of current events, aware of past history, and vocabulary " average  Impulse control: good  Insight/judgment: limited/limited    Pain: reported having pain in lower back and joints  Pain scale: 7      Vital signs in last 24 hours:    Temp:  [96.8 °F (36 °C)-98.5 °F (36.9 °C)] 97.7 °F (36.5 °C)  HR:  [76-84] 76  Resp:  [16-18] 16  BP: (130-159)/(60-68) 159/68    Laboratory results: I have personally reviewed all pertinent laboratory/tests results    Results from the past 24 hours:   Recent Results (from the past 24 hour(s))   UA w Reflex to Microscopic w Reflex to Culture    Collection Time: 02/02/24  1:35 PM    Specimen: Urine, Clean Catch   Result Value Ref Range    Color, UA Yellow Straw, Yellow, Pale Yellow    Clarity, UA Slightly Cloudy (A) Clear, Other    Specific Gravity, UA 1.015 1.003 - 1.040    pH, UA 6.0 4.5, 5.0, 5.5, 6.0, 6.5, 7.0, 7.5, 8.0    Leukocytes, .0 (A) Negative    Nitrite, UA Negative Negative    Protein, UA 15 (Trace) (A) Negative mg/dl    Glucose, UA Negative Negative mg/dl    Ketones, UA Negative Negative mg/dl    Bilirubin, UA Negative Negative    Occult Blood, .0 (A) Negative    UROBILINOGEN UA Negative 1.0, Negative mg/dL   Urine Microscopic    Collection Time: 02/02/24  1:35 PM   Result Value Ref Range    RBC, UA 0-1 None Seen, 0-1, 1-2, 2-4, 0-5 /hpf    WBC, UA 10-20 (A) None Seen, 0-1, 1-2, 0-5, 2-4 /hpf    Epithelial Cells Moderate (A) None Seen, Occasional /hpf    Bacteria, UA Moderate (A) None Seen, Occasional /hpf    MUCUS THREADS Occasional (A) None Seen       Progress Toward Goals: limited improvement    Assessment:  Principal Problem:    Major depressive disorder, recurrent episode (HCC)  Active Problems:    Medical clearance for psychiatric admission    Acquired hypothyroidism    Tobacco abuse    Alcohol abuse    Essential hypertension    Mixed hyperlipidemia    Constipation        Plan:  - f/u SLIM recs regarding the medical problems  - Continue medication titration and treatment plan; adjust medication to optimize  treatment response and as clinically indicated.     Scheduled medications:  Current Facility-Administered Medications   Medication Dose Route Frequency Provider Last Rate    aluminum-magnesium hydroxide-simethicone  30 mL Oral Q4H PRN Carlotta Mcgrath MD      atorvastatin  20 mg Oral Daily With Dinner Diana Moreau, MIRIAM      cefpodoxime  200 mg Oral BID With Meals MIRIAM Hayes      cholecalciferol  2,000 Units Oral Daily Diana Moreau, MIRIAM      clopidogrel  75 mg Oral Daily Diana ZevMIRIAM Rodriguez      cyanocobalamin  1,000 mcg Oral Daily Diana Moreau, MIRIAM      docusate sodium  100 mg Oral BID Diana Moreau, MIRIAM      FLUoxetine  20 mg Oral Daily Carri Maharaj DO      haloperidol lactate  5 mg Intramuscular Q4H PRN Max 4/day Carlotta Mcgrath MD      hydrOXYzine HCL  25 mg Oral Q6H PRN Max 4/day Carlotta Mcgrath MD      levothyroxine  175 mcg Oral Early Morning MIRIAM Wray      LORazepam  1 mg Intramuscular Q6H PRN Max 3/day Carlotta Mcgrath MD      losartan  25 mg Oral Daily Diana Moreau, MIRIAM      magnesium hydroxide  30 mL Oral Daily PRN MIRIAM Wray      melatonin  3 mg Oral HS Carlotta Mcgrath MD      nicotine  1 patch Transdermal Daily Diana Moreau, MIRIAM      nicotine polacrilex  4 mg Oral Q2H PRN Carlotta Mcgrath MD      polyethylene glycol  17 g Oral Daily PRN MIRIAM Hayes      polyethylene glycol  17 g Oral Daily MIRIAM Wray      risperiDONE  0.25 mg Oral Q4H PRN Max 6/day Carlotta Mcgrath MD      risperiDONE  0.5 mg Oral Q4H PRN Max 3/day Carlotta Mcgrath MD      risperiDONE  1 mg Oral Q2H PRN Max 3/day Carlotta Mcgrath MD      senna  2 tablet Oral HS MIRIAM Hayes          PRN:    aluminum-magnesium hydroxide-simethicone    haloperidol lactate    hydrOXYzine HCL    LORazepam    magnesium hydroxide    nicotine polacrilex    polyethylene glycol    risperiDONE     risperiDONE    risperiDONE    - Observation: routine    - VS: as per unit protocol  - Diet: Regular diet  - Psychoeducation (benefits and potential risks) discussed, importance of compliance with the psychiatric treatment reiterated, and the patient verbalized understanding of the matter  - Encourage group attendance and milieu therapy    - The pt was educated and agreed to verbalize any suicidal thoughts, frustrations or concerns to the nursing staff, immediately.    - Dispo: TBD       Next of Kin  Extended Emergency Contact Information  Primary Emergency Contact: POLLARDBUDDY  Mobile Phone: [phone]  Relation: Sister      Counseling / Coordination of Care    Patient's progress reviewed with nursing staff.  Medications, treatment progress and treatment plan reviewed with patient.  Medication education provided to patient.  Reassurance and supportive therapy provided.  Reoriented to reality and reassured.  Encouraged participation in milieu and group therapy on the unit.     Radha James MD  Attending Psychiatrist   [facility]

## 2024-02-04 LAB — BACTERIA UR CULT: ABNORMAL

## 2024-02-04 PROCEDURE — 99232 SBSQ HOSP IP/OBS MODERATE 35: CPT | Performed by: STUDENT IN AN ORGANIZED HEALTH CARE EDUCATION/TRAINING PROGRAM

## 2024-02-04 RX ADMIN — CEFPODOXIME PROXETIL 200 MG: 200 TABLET, FILM COATED ORAL at 15:39

## 2024-02-04 RX ADMIN — SENNOSIDES 17.2 MG: 8.6 TABLET, FILM COATED ORAL at 21:25

## 2024-02-04 RX ADMIN — CYANOCOBALAMIN TAB 1000 MCG 1000 MCG: 1000 TAB at 08:05

## 2024-02-04 RX ADMIN — CLOPIDOGREL BISULFATE 75 MG: 75 TABLET ORAL at 08:05

## 2024-02-04 RX ADMIN — FLUOXETINE HYDROCHLORIDE 20 MG: 20 CAPSULE ORAL at 08:05

## 2024-02-04 RX ADMIN — LOSARTAN POTASSIUM 25 MG: 25 TABLET, FILM COATED ORAL at 08:06

## 2024-02-04 RX ADMIN — DOCUSATE SODIUM 100 MG: 100 CAPSULE, LIQUID FILLED ORAL at 08:05

## 2024-02-04 RX ADMIN — LEVOTHYROXINE SODIUM 175 MCG: 0.15 TABLET ORAL at 05:49

## 2024-02-04 RX ADMIN — ATORVASTATIN CALCIUM 20 MG: 20 TABLET, FILM COATED ORAL at 15:39

## 2024-02-04 RX ADMIN — NICOTINE 1 PATCH: 14 PATCH, EXTENDED RELEASE TRANSDERMAL at 08:05

## 2024-02-04 RX ADMIN — MELATONIN TAB 3 MG 3 MG: 3 TAB at 21:25

## 2024-02-04 RX ADMIN — DOCUSATE SODIUM 100 MG: 100 CAPSULE, LIQUID FILLED ORAL at 17:07

## 2024-02-04 RX ADMIN — POLYETHYLENE GLYCOL 3350 17 G: 17 POWDER, FOR SOLUTION ORAL at 08:06

## 2024-02-04 RX ADMIN — CHOLECALCIFEROL TAB 25 MCG (1000 UNIT) 2000 UNITS: 25 TAB at 08:04

## 2024-02-04 RX ADMIN — CEFPODOXIME PROXETIL 200 MG: 200 TABLET, FILM COATED ORAL at 07:17

## 2024-02-04 NOTE — NURSING NOTE
Patient is pleasant and cooperative with care. Denies all psych s/s. Medication compliant. Patient is isolative to self, visible sitting in the hallways on the unit. Patient denies any needs at this time. Safety checks ongoing.

## 2024-02-04 NOTE — PLAN OF CARE
Problem: SELF HARM/SUICIDALITY  Goal: Will have no self-injury during hospital stay  Description: INTERVENTIONS:  - Q 15 MINUTES: Routine safety checks  - Q WAKING SHIFT & PRN: Assess risk to determine if routine checks are adequate to maintain patient safety  - Encourage patient to participate actively in care by formulating a plan to combat response to suicidal ideation, identify supports and resources  Outcome: Progressing     Problem: DEPRESSION  Goal: Will be euthymic at discharge  Description: INTERVENTIONS:  - Administer medication as ordered  - Provide emotional support via 1:1 interaction with staff  - Encourage involvement in milieu/groups/activities  - Monitor for social isolation  Outcome: Progressing     Problem: ANXIETY  Goal: Will report anxiety at manageable levels  Description: INTERVENTIONS:  - Administer medication as ordered  - Teach and encourage coping skills  - Provide emotional support  - Assess patient/family for anxiety and ability to cope  Outcome: Progressing  Goal: By discharge: Patient will verbalize 2 strategies to deal with anxiety  Description: Interventions:  - Identify any obvious source/trigger to anxiety  - Staff will assist patient in applying identified coping technique/skills  - Encourage attendance of scheduled groups and activities  Outcome: Progressing     Problem: Alteration in Thoughts and Perception  Goal: Attend and participate in unit activities, including therapeutic, recreational, and educational groups  Description: Interventions:  -Encourage Visitation and family involvement in care  Outcome: Progressing

## 2024-02-04 NOTE — NURSING NOTE
Patient is medication and meal compliant. No complaints of pain or discomfort. Patient is visible on the unit and social with peers. Patient denies symptoms of depression or anxiety. Patient also denies SI, AH or VH. Patient is scheduled for discharge on Tuesday with no new issues or concerns noted at this time. Will continue to monitor patient for changes in mood or behavior.

## 2024-02-04 NOTE — PROGRESS NOTES
"  Progress Note - Behavioral Health   [facility] 68 [facility]N: [id number]  Unit/Bed#: [facility] 643-01 Encounter: [id number]       Patient was visited on unit for continuing care; chart reviewed and discussed with the nursing staff. On approach, the patient was calm, pleasant and superficially cooperative. Endorsed \"good\" mood and denied anxiety sxs. Denied any changes in appetite, and energy level. No problem initiating and maintaining sleep. Denied A/VH currently. Denied SI/HI, intent or plan upon direct inquiry at this time.    The patient remains visible in the milieu.  No reports of aggression or self-injurious behavior on unit. No PRN medications used in the past 24 hours.    Patient accepted all offered medications and reported feeling better. No adverse effects of medications noted or reported.      Current Mental Status Evaluation:  Appearance and attitude: appeared as stated age, dressed in hospital attire, with good hygiene  Eye contact: good  Motor Function: within normal limits, intact gait, No PMA/PMR  Gait/station: normal gait/station and normal balance  Speech: normal for rate, rhythm, volume, and latency with decreased amount  Language: No overt abnormality  Mood/affect:  \"gurmeet\"  / Affect was constricted but reactive, mood congruent, brighter  Thought Processes: linear  Thought content: denied suicidal ideations or homicidal ideations, no overt delusions elicited  Associations: concrete associations  Perceptual disturbances: denies Auditory/Visual/Tactile Hallucinations  Orientation: oriented to time, person, place and to the situational context  Cognitive Function: intact  Memory: not cooperative with formal MMSE  Intellect: unable to assess  Fund of knowledge: aware of current events, aware of past history, and vocabulary average  Impulse control: good  Insight/judgment: limited/limited    Pain: reported having pain in lower back  Pain scale: 7      Vital signs in " last 24 hours:    Temp:  [97.3 °F (36.3 °C)-98.5 °F (36.9 °C)] 97.7 °F (36.5 °C)  HR:  [73-79] 79  Resp:  [16] 16  BP: (129-174)/(58-78) 144/63    Laboratory results: I have personally reviewed all pertinent laboratory/tests results    Results from the past 24 hours: No results found for this or any previous visit (from the past 24 hour(s)).    Progress Toward Goals: continues to improve    Assessment:  Principal Problem:    Major depressive disorder, recurrent episode (HCC)  Active Problems:    Medical clearance for psychiatric admission    Acquired hypothyroidism    Tobacco abuse    Alcohol abuse    Essential hypertension    Mixed hyperlipidemia    Constipation        Plan:  - f/u SLIM recs regarding the medical problems  - Continue medication titration and treatment plan; adjust medication to optimize treatment response and as clinically indicated.     Scheduled medications:  Current Facility-Administered Medications   Medication Dose Route Frequency Provider Last Rate    aluminum-magnesium hydroxide-simethicone  30 mL Oral Q4H PRN Carlotta Mcgrath MD      atorvastatin  20 mg Oral Daily With Dinner MIRIAM Hayes      cefpodoxime  200 mg Oral BID With Meals MIRIAM Hayes      cholecalciferol  2,000 Units Oral Daily MIRIAM Hayes      clopidogrel  75 mg Oral Daily MIRIAM Hayes      cyanocobalamin  1,000 mcg Oral Daily Diana Moreau, MIRIAM      docusate sodium  100 mg Oral BID MIRIAM Hayes      FLUoxetine  20 mg Oral Daily Carri Maharaj DO      haloperidol lactate  5 mg Intramuscular Q4H PRN Max 4/day Carlotta Mcgrath MD      hydrOXYzine HCL  25 mg Oral Q6H PRN Max 4/day Carlotta Mcgrath MD      levothyroxine  175 mcg Oral Early Morning MIRIAM Wray      LORazepam  1 mg Intramuscular Q6H PRN Max 3/day Carlotta Mcgrath MD      losartan  25 mg Oral Daily Diana Moreau, MIRIAM      magnesium hydroxide  30 mL Oral Daily PRN  MIRIAM Wray      melatonin  3 mg Oral HS Carlotta Mcgrath MD      nicotine  1 patch Transdermal Daily MIRIAM Hayes      nicotine polacrilex  4 mg Oral Q2H PRN Carlotta Mcgrath MD      polyethylene glycol  17 g Oral Daily PRN MIRIAM Hayes      polyethylene glycol  17 g Oral Daily MIRIAM Wray      risperiDONE  0.25 mg Oral Q4H PRN Max 6/day Carlotta Mcgrath MD      risperiDONE  0.5 mg Oral Q4H PRN Max 3/day Carlotta Mcgrath MD      risperiDONE  1 mg Oral Q2H PRN Max 3/day Carlotta Mcgrath MD      senna  2 tablet Oral HS MIRIAM Hayes          PRN:    aluminum-magnesium hydroxide-simethicone    haloperidol lactate    hydrOXYzine HCL    LORazepam    magnesium hydroxide    nicotine polacrilex    polyethylene glycol    risperiDONE    risperiDONE    risperiDONE    - Observation: routine    - VS: as per unit protocol  - Diet: Regular diet  - Psychoeducation (benefits and potential risks) discussed, importance of compliance with the psychiatric treatment reiterated, and the patient verbalized understanding of the matter  - Encourage group attendance and milieu therapy    - The pt was educated and agreed to verbalize any suicidal thoughts, frustrations or concerns to the nursing staff, immediately.    - Dispo: TBD       Next of Kin  Extended Emergency Contact Information  Primary Emergency Contact: BUDDY POLLARD  Mobile Phone: [phone]  Relation: Sister      Counseling / Coordination of Care    Patient's progress reviewed with nursing staff.  Medications, treatment progress and treatment plan reviewed with patient.  Medication education provided to patient.  Reassurance and supportive therapy provided.  Reoriented to reality and reassured.  Encouraged participation in milieu and group therapy on the unit.     Radha James MD  Attending Psychiatrist   [facility]

## 2024-02-05 PROCEDURE — 99232 SBSQ HOSP IP/OBS MODERATE 35: CPT | Performed by: STUDENT IN AN ORGANIZED HEALTH CARE EDUCATION/TRAINING PROGRAM

## 2024-02-05 RX ORDER — MELATONIN
2000 DAILY
Qty: 44 TABLET | Refills: 0 | Status: SHIPPED | OUTPATIENT
Start: 2024-02-06 | End: 2024-02-28

## 2024-02-05 RX ORDER — LOSARTAN POTASSIUM 25 MG/1
25 TABLET ORAL DAILY
Qty: 30 TABLET | Refills: 1 | Status: SHIPPED | OUTPATIENT
Start: 2024-02-06

## 2024-02-05 RX ORDER — FLUOXETINE HYDROCHLORIDE 20 MG/1
20 CAPSULE ORAL DAILY
Qty: 30 CAPSULE | Refills: 1 | Status: SHIPPED | OUTPATIENT
Start: 2024-02-06

## 2024-02-05 RX ORDER — LEVOTHYROXINE SODIUM 175 UG/1
175 TABLET ORAL
Qty: 30 TABLET | Refills: 1 | Status: SHIPPED | OUTPATIENT
Start: 2024-02-06

## 2024-02-05 RX ORDER — LANOLIN ALCOHOL/MO/W.PET/CERES
3 CREAM (GRAM) TOPICAL
Qty: 30 TABLET | Refills: 1 | Status: SHIPPED | OUTPATIENT
Start: 2024-02-05

## 2024-02-05 RX ORDER — DOCUSATE SODIUM 100 MG/1
100 CAPSULE, LIQUID FILLED ORAL 2 TIMES DAILY
Qty: 30 CAPSULE | Refills: 1 | Status: SHIPPED | OUTPATIENT
Start: 2024-02-05

## 2024-02-05 RX ORDER — ATORVASTATIN CALCIUM 20 MG/1
20 TABLET, FILM COATED ORAL
Qty: 30 TABLET | Refills: 1 | Status: SHIPPED | OUTPATIENT
Start: 2024-02-05

## 2024-02-05 RX ORDER — CEFPODOXIME PROXETIL 200 MG/1
200 TABLET, FILM COATED ORAL 2 TIMES DAILY WITH MEALS
Qty: 8 TABLET | Refills: 0 | Status: SHIPPED | OUTPATIENT
Start: 2024-02-05 | End: 2024-02-09

## 2024-02-05 RX ORDER — SENNOSIDES 8.6 MG
17.2 TABLET ORAL
Qty: 30 TABLET | Refills: 1 | Status: SHIPPED | OUTPATIENT
Start: 2024-02-05

## 2024-02-05 RX ORDER — CLOPIDOGREL BISULFATE 75 MG/1
75 TABLET ORAL DAILY
Qty: 30 TABLET | Refills: 1 | Status: SHIPPED | OUTPATIENT
Start: 2024-02-06

## 2024-02-05 RX ADMIN — DOCUSATE SODIUM 100 MG: 100 CAPSULE, LIQUID FILLED ORAL at 08:09

## 2024-02-05 RX ADMIN — FLUOXETINE HYDROCHLORIDE 20 MG: 20 CAPSULE ORAL at 08:08

## 2024-02-05 RX ADMIN — DOCUSATE SODIUM 100 MG: 100 CAPSULE, LIQUID FILLED ORAL at 17:04

## 2024-02-05 RX ADMIN — MELATONIN TAB 3 MG 3 MG: 3 TAB at 21:07

## 2024-02-05 RX ADMIN — LOSARTAN POTASSIUM 25 MG: 25 TABLET, FILM COATED ORAL at 08:09

## 2024-02-05 RX ADMIN — ATORVASTATIN CALCIUM 20 MG: 20 TABLET, FILM COATED ORAL at 15:43

## 2024-02-05 RX ADMIN — CYANOCOBALAMIN TAB 1000 MCG 1000 MCG: 1000 TAB at 08:09

## 2024-02-05 RX ADMIN — NICOTINE 1 PATCH: 14 PATCH, EXTENDED RELEASE TRANSDERMAL at 08:10

## 2024-02-05 RX ADMIN — LEVOTHYROXINE SODIUM 175 MCG: 0.15 TABLET ORAL at 05:48

## 2024-02-05 RX ADMIN — SENNOSIDES 17.2 MG: 8.6 TABLET, FILM COATED ORAL at 21:07

## 2024-02-05 RX ADMIN — POLYETHYLENE GLYCOL 3350 17 G: 17 POWDER, FOR SOLUTION ORAL at 08:09

## 2024-02-05 RX ADMIN — CEFPODOXIME PROXETIL 200 MG: 200 TABLET, FILM COATED ORAL at 08:08

## 2024-02-05 RX ADMIN — CHOLECALCIFEROL TAB 25 MCG (1000 UNIT) 2000 UNITS: 25 TAB at 08:09

## 2024-02-05 RX ADMIN — CEFPODOXIME PROXETIL 200 MG: 200 TABLET, FILM COATED ORAL at 15:43

## 2024-02-05 RX ADMIN — CLOPIDOGREL BISULFATE 75 MG: 75 TABLET ORAL at 08:08

## 2024-02-05 NOTE — NURSING NOTE
Patient is medication and meal compliant. No complaints of pain or discomfort. Patient denies symptoms of depression or anxiety. Patient also denies SI, AH or VH.  Patient is scheduled for discharge tomorrow with no new issues or concerns noted at this time. Will continue to monitor patient for changes in mood or behavior.

## 2024-02-05 NOTE — TREATMENT TEAM
Pt did complete and sign [place] Relapse prevention plan.  Copy in chart and folder.  Pt bright and cooperative. Visible on unit.     02/05/24 1100   Activity/Group Checklist   Group Admission/Discharge   Attendance Attended   Attendance Duration (min) 16-30   Interactions Interacted appropriately   Affect/Mood Appropriate   Goals Achieved Identified feelings;Identified relapse prevention strategies;Discussed self-esteem issues;Able to listen to others;Able to engage in interactions

## 2024-02-05 NOTE — TREATMENT TEAM
02/05/24 0731   Team Meeting   Meeting Type Daily Rounds   Initial Conference Date 02/05/24   Team Members Present   Team Members Present Physician;Nurse;;;Occupational Therapist   Physician Team Member Laurita Estrada   Nursing Team Member Sam Seay   Care Management Team Member Halima   Social Work Team Member Theresa   OT Team Member Kj   Patient/Family Present   Patient Present No   Patient's Family Present No     Patient is pleasant, calm, cooperative with care, and medication compliant. Patient will discharge to home tomorrow 2/6/23 with 30 days +1 refill meds. No OP follow up due to the patient living in [place] and reporting she will be returning home soon and will connect with OP services there.

## 2024-02-05 NOTE — DISCHARGE INSTR - APPOINTMENTS
Janessa, or Miriam, our Behavioral Health Nurse Navigators, will be calling you after your discharge, on the phone number that you provided.  They will be available as an additional support, if needed.   If you wish to speak with one of them, you may contact Janessa at [phone] or Miriam at [phone].    DISPLAY PLAN FREE TEXT

## 2024-02-05 NOTE — CASE MANAGEMENT
The patient reports feeling well and is in agreement with discharge back to her sister's tomorrow 2/6/24. The patient reported her sister will pick her up at 13h.    Due to the patient being on vacation currently, no OP appts will be scheduled by CM. The patient will be discharged with 30 days +1 refill of medications. The patient is aware how to follow up with OP PCP and Psych upon returning to [place].

## 2024-02-05 NOTE — PLAN OF CARE
Problem: Alteration in Thoughts and Perception  Goal: Attend and participate in unit activities, including therapeutic, recreational, and educational groups  Description: Interventions:  -Encourage Visitation and family involvement in care  Outcome: Not Progressing

## 2024-02-05 NOTE — NURSING NOTE
Patient is pleasant, calm and cooperative. Brightens upon approach. Medication compliant. Patient is withdrawn to room during shift. Patient denies any needs at this time. Safety checks ongoing.

## 2024-02-05 NOTE — BH TRANSITION RECORD
Contact Information: If you have any questions, concerns, pended studies, tests and/or procedures, or emergencies regarding your inpatient behavioral health visit. Please contact [facility] [phone] and ask to speak to a , nurse or physician. A contact is available 24 hours/ 7 days a week at this number.     Summary of Procedures Performed During your Stay:  Below is a list of major procedures performed during your hospital stay and a summary of results:  - Cardiac Procedures/Studies: ECG-normal sinus rhythm.     Pending Studies (From admission, onward)      None          Please follow up on the above pending studies with your PCP and/or referring provider.

## 2024-02-05 NOTE — DISCHARGE INSTR - OTHER ORDERS
You are being discharged to your sister's home, [address] [facility].     [facility] Residents:   *Emergencies:   If you are experiencing a mental health emergency, you may call the [facility] Crisis Intervention Office 24 hours a day, 7 days a week at [phone] or call 911.   *Telephone Support Services:   The PEER LINE is a toll-free phone number for [facility] residents who are seeking a listening ear for additional support. Hours of operation are 8:30 am - 4:30 pm, Monday through Friday. A representative can be reached at [phone] ([phone]).   *Provider Appointments (if you have an appointment):   You have an appointment scheduled with the  at the Information and Referral Unit. This will be conducted at the Office of Mental Health located at [address] [place] [address]. The phone number is [phone]. A social history will be obtained as well as a discussion of your personal financial liability. The entire intake appointment will take from one and a half to two hours. You will then be directed to appropriate services.   If you don’t have an appointment:   You will need to call the Information and Referral Unit for an intake appointment. The office is located at the Office of Mental Health located at [address]. The phone number is [phone]. You will then be provided with an intake appointment at the office. A social history will be obtained as well as a discussion of your personal financial liability. The entire intake appointment will take one and a half to two hours. You will then be directed to appropriate services.     [facility] has been in operation for 26 years and, during this time, has become essential to the psychiatric needs of [place]. We believe it is the warmth of our staff, the quality of our services, and the professional treatment that we offer that sets our programs  apart from other treatment centers. Furthermore, in order to enhance the overall well being of our clients, we promote mental health education and prevention, participating in mental health professional conferences and community outreach programs.  [phone]     [facility]  Cualquiera que debbie tus necesidades o las de tus seres queridos, el personal de Yazan Weissranbeck desea ayudarte. Nuestro equipo de expertos está disponible las 24 horas del día, los 7 días de la semana, para atender a toda persona, en cualquier momento.  [facility]  [address]  [address]  [place] [address]  Tel. [phone]  Fax. [phone]

## 2024-02-05 NOTE — PROGRESS NOTES
Progress Note - Behavioral Health   Ramandeep Peñalozadonklarissa rickAbel 68 y.o. female MRN: [id number]  Unit/Bed#: [facility] 643-01 Encounter: [id number]    Subjective:   Per nursing report, patient has been doing well.  She remains medication compliant.  She has been calm and cooperative on the unit.  She has been without acute behavioral issues. Patient is doing well today.  Interview done with assistance of Slovak-speaking .  She reports tolerating medications well.  She notes that her mood is good.  She denies any SI, HI, or AVH.  She plans to stay with her sister for a short period of time before returning to [place] on discharge.  She denies any questions or concerns at this time.    Behavior over the last 24 hours:  unchanged  Sleep: normal  Appetite: normal  Medication side effects: No  ROS: no complaints    Mental Status Evaluation:  Appearance:  dressed appropriately   Behavior:  calm, pleasant, and cooperative   Speech:  normal rate and volume, speaks Slovak   Mood:  euthymic   Affect:  normal range and intensity, appropriate   Thought Process:  linear and goal directed   Associations: intact associations   Thought Content:  no overt delusions   Perceptual Disturbances: denies auditory or visual hallucinations when asked, does not appear responding to internal stimuli   Risk Potential: Suicidal ideation - None  Homicidal ideation - None  Potential for aggression - Not at present   Sensorium:  oriented to person, place, and time/date   Memory:  recent and remote memory grossly intact   Consciousness:  alert and awake   Attention/Concentration: attention span and concentration are age appropriate   Insight:  limited   Judgment: limited   Gait/Station: normal gait/station, normal balance   Motor Activity: no abnormal movements     Medications: all current active meds have been reviewed.  Current Facility-Administered Medications   Medication Dose Route Frequency Provider Last Rate     aluminum-magnesium hydroxide-simethicone  30 mL Oral Q4H PRN Carlotta Mcgrath MD      atorvastatin  20 mg Oral Daily With Dinner MIRIAM Hayes      cefpodoxime  200 mg Oral BID With Meals iDana Moreau, MIRIAM      cholecalciferol  2,000 Units Oral Daily Diana Moreau, MIRIAM      clopidogrel  75 mg Oral Daily MIRIAM Hayes      cyanocobalamin  1,000 mcg Oral Daily Diana Moreau, MIRIAM      docusate sodium  100 mg Oral BID Diana Moreau, MIRIAM      FLUoxetine  20 mg Oral Daily Carri Maharaj DO      haloperidol lactate  5 mg Intramuscular Q4H PRN Max 4/day Carlotta Mcgrath MD      hydrOXYzine HCL  25 mg Oral Q6H PRN Max 4/day Carlotta Mcgrath MD      levothyroxine  175 mcg Oral Early Morning MIRIAM Wray      LORazepam  1 mg Intramuscular Q6H PRN Max 3/day Carlotta Mcgrath MD      losartan  25 mg Oral Daily Diana Moreau, MIRIAM      magnesium hydroxide  30 mL Oral Daily PRN MIRIAM Wray      melatonin  3 mg Oral HS Carlotta Mcgrath MD      nicotine  1 patch Transdermal Daily MIRIAM Hayes      nicotine polacrilex  4 mg Oral Q2H PRN Carlotta Mcgrath MD      polyethylene glycol  17 g Oral Daily PRN MIRIAM Hayes      polyethylene glycol  17 g Oral Daily MIRIAM Wray      risperiDONE  0.25 mg Oral Q4H PRN Max 6/day Carlotta Mcgrath MD      risperiDONE  0.5 mg Oral Q4H PRN Max 3/day Carlotta Mcgrath MD      risperiDONE  1 mg Oral Q2H PRN Max 3/day Carlotta Mcgrath MD      senna  2 tablet Oral HS MIRIAM Hayes         Labs: I have personally reviewed all pertinent laboratory/tests results  Most Recent Labs:   Lab Results   Component Value Date    WBC 4.60 01/30/2024    RBC 4.44 01/30/2024    HGB 14.1 01/30/2024    HCT 44.0 01/30/2024     01/30/2024    RDW 13.9 01/30/2024    NEUTROABS 3.26 01/30/2024    TOTANEUTABS 5.77 01/29/2024    SODIUM 137 01/30/2024    K 3.9 01/30/2024      01/30/2024    CO2 28 01/30/2024    BUN 9 01/30/2024    CREATININE 0.51 (L) 01/30/2024    GLUC 106 01/30/2024    CALCIUM 9.0 01/30/2024    AST 13 01/29/2024    ALT 10 01/29/2024    ALKPHOS 81 01/29/2024    TP 6.6 01/29/2024    ALB 3.7 01/29/2024    TBILI 0.71 01/29/2024    CHOLESTEROL 110 01/30/2024    HDL 32 (L) 01/30/2024    TRIG 61 01/30/2024    LDLCALC 66 01/30/2024    NONHDLC 78 01/30/2024    WWA6PYIZSSHD 3.281 01/29/2024    HGBA1C 5.4 01/30/2024     01/30/2024         Assessment/Plan   Principal Problem:    Major depressive disorder, recurrent episode (HCC)  Active Problems:    Medical clearance for psychiatric admission    Acquired hypothyroidism    Tobacco abuse    Alcohol abuse    Essential hypertension    Mixed hyperlipidemia    Constipation    Recommended Treatment:   Continue Prozac 20 mg daily.  Continue melatonin 3 mg nightly.  Continue all other medications at current doses as above.  Encourage group therapy, milieu therapy and occupational therapy  All current active medications have been reviewed  Encourage group therapy, milieu therapy and occupational therapy  Behavioral Health checks every 7 minutes  Discharge planned for tomorrow    ----------------------------------------    Progress Toward Goals: progressing    Risks / Benefits of Treatment:    Risks, benefits, and possible side effects of medications explained to patient and patient verbalizes understanding and agreement for treatment.    Counseling / Coordination of Care:    Patient's progress discussed with staff in treatment team meeting.  Medications, treatment progress and treatment plan reviewed with patient.    Garrison Theodore MD 02/05/24

## 2024-02-06 VITALS
HEART RATE: 65 BPM | HEIGHT: 64 IN | RESPIRATION RATE: 15 BRPM | OXYGEN SATURATION: 94 % | WEIGHT: 204.56 LBS | TEMPERATURE: 97 F | DIASTOLIC BLOOD PRESSURE: 82 MMHG | BODY MASS INDEX: 34.92 KG/M2 | SYSTOLIC BLOOD PRESSURE: 147 MMHG

## 2024-02-06 PROCEDURE — 99238 HOSP IP/OBS DSCHRG MGMT 30/<: CPT | Performed by: STUDENT IN AN ORGANIZED HEALTH CARE EDUCATION/TRAINING PROGRAM

## 2024-02-06 RX ADMIN — CLOPIDOGREL BISULFATE 75 MG: 75 TABLET ORAL at 08:04

## 2024-02-06 RX ADMIN — CYANOCOBALAMIN TAB 1000 MCG 1000 MCG: 1000 TAB at 08:03

## 2024-02-06 RX ADMIN — POLYETHYLENE GLYCOL 3350 17 G: 17 POWDER, FOR SOLUTION ORAL at 08:04

## 2024-02-06 RX ADMIN — FLUOXETINE HYDROCHLORIDE 20 MG: 20 CAPSULE ORAL at 08:03

## 2024-02-06 RX ADMIN — NICOTINE 1 PATCH: 14 PATCH, EXTENDED RELEASE TRANSDERMAL at 08:04

## 2024-02-06 RX ADMIN — CHOLECALCIFEROL TAB 25 MCG (1000 UNIT) 2000 UNITS: 25 TAB at 08:04

## 2024-02-06 RX ADMIN — CEFPODOXIME PROXETIL 200 MG: 200 TABLET, FILM COATED ORAL at 08:03

## 2024-02-06 RX ADMIN — LEVOTHYROXINE SODIUM 175 MCG: 0.15 TABLET ORAL at 05:46

## 2024-02-06 RX ADMIN — DOCUSATE SODIUM 100 MG: 100 CAPSULE, LIQUID FILLED ORAL at 08:04

## 2024-02-06 RX ADMIN — LOSARTAN POTASSIUM 25 MG: 25 TABLET, FILM COATED ORAL at 08:04

## 2024-02-06 NOTE — PLAN OF CARE
Problem: DISCHARGE PLANNING - CARE MANAGEMENT  Goal: Discharge to post-acute care or home with appropriate resources  Description: INTERVENTIONS:  - Conduct assessment to determine patient/family and health care team treatment goals, and need for post-acute services based on payer coverage, community resources, and patient preferences, and barriers to discharge  - Address psychosocial, clinical, and financial barriers to discharge as identified in assessment in conjunction with the patient/family and health care team  - Arrange appropriate level of post-acute services according to patient’s   needs and preference and payer coverage in collaboration with the physician and health care team  - Communicate with and update the patient/family, physician, and health care team regarding progress on the discharge plan  - Arrange appropriate transportation to post-acute venues  Outcome: Adequate for Discharge    The patient is cleared to discharge today. The patient will be returning to her sister's home, and her sister will be picking her up at 13h. The patient is in agreement with her discharge plan. The patient is currently on short vacation here from [place] and reports she will be returning home soon. The patient will leave the unit with [facility]. The patient is in agreement to connect with former mental health services once back in [place]. Additional resources were put into the patient's AVS including the crisis number and mental health services in [place]. Patient signed IMM.

## 2024-02-06 NOTE — PLAN OF CARE
Problem: SELF HARM/SUICIDALITY  Goal: Will have no self-injury during hospital stay  Description: INTERVENTIONS:  - Q 15 MINUTES: Routine safety checks  - Q WAKING SHIFT & PRN: Assess risk to determine if routine checks are adequate to maintain patient safety  - Encourage patient to participate actively in care by formulating a plan to combat response to suicidal ideation, identify supports and resources  Outcome: Completed     Problem: DEPRESSION  Goal: Will be euthymic at discharge  Description: INTERVENTIONS:  - Administer medication as ordered  - Provide emotional support via 1:1 interaction with staff  - Encourage involvement in milieu/groups/activities  - Monitor for social isolation  Outcome: Completed     Problem: ANXIETY  Goal: Will report anxiety at manageable levels  Description: INTERVENTIONS:  - Administer medication as ordered  - Teach and encourage coping skills  - Provide emotional support  - Assess patient/family for anxiety and ability to cope  Outcome: Completed  Goal: By discharge: Patient will verbalize 2 strategies to deal with anxiety  Description: Interventions:  - Identify any obvious source/trigger to anxiety  - Staff will assist patient in applying identified coping technique/skills  - Encourage attendance of scheduled groups and activities  Outcome: Completed     Problem: DISCHARGE PLANNING - CARE MANAGEMENT  Goal: Discharge to post-acute care or home with appropriate resources  Description: INTERVENTIONS:  - Conduct assessment to determine patient/family and health care team treatment goals, and need for post-acute services based on payer coverage, community resources, and patient preferences, and barriers to discharge  - Address psychosocial, clinical, and financial barriers to discharge as identified in assessment in conjunction with the patient/family and health care team  - Arrange appropriate level of post-acute services according to patient’s   needs and preference and payer  coverage in collaboration with the physician and health care team  - Communicate with and update the patient/family, physician, and health care team regarding progress on the discharge plan  - Arrange appropriate transportation to post-acute venues  Outcome: Completed     Problem: Alteration in Thoughts and Perception  Goal: Attend and participate in unit activities, including therapeutic, recreational, and educational groups  Description: Interventions:  -Encourage Visitation and family involvement in care  Outcome: Completed     Problem: Nutrition/Hydration-ADULT  Goal: Nutrient/Hydration intake appropriate for improving, restoring or maintaining nutritional needs  Description: Monitor and assess patient's nutrition/hydration status for malnutrition. Collaborate with interdisciplinary team and initiate plan and interventions as ordered.  Monitor patient's weight and dietary intake as ordered or per policy. Utilize nutrition screening tool and intervene as necessary. Determine patient's food preferences and provide high-protein, high-caloric foods as appropriate.     INTERVENTIONS:  - Monitor oral intake, urinary output, labs, and treatment plans  - Assess nutrition and hydration status and recommend course of action  - Evaluate amount of meals eaten  - Assist patient with eating if necessary   - Allow adequate time for meals  - Recommend/ encourage appropriate diets, oral nutritional supplements, and vitamin/mineral supplements  - Order, calculate, and assess calorie counts as needed  - Recommend, monitor, and adjust tube feedings and TPN/PPN based on assessed needs  - Assess need for intravenous fluids  - Provide specific nutrition/hydration education as appropriate  - Include patient/family/caregiver in decisions related to nutrition  Outcome: Completed

## 2024-02-06 NOTE — DISCHARGE SUMMARY
"  Discharge Summary - Behavioral Health   Ramandeep Tariq Cruz 68 y.o. female MRN: [id number]  Unit/Bed#: [facility] 643-01 Encounter: [id number]     Admission Date:   Admission Orders (From admission, onward)       Ordered        01/30/24 0030  ED TO DIFFERENT CAMPUS [facility] UNIT or INPATIENT MEDICAL UNIT to [facility] UNIT (using Discharge Readmit Navigator) - Admit Patient to  Behavioral Health Unit  Once                                Discharge Date: 02/06/24     Attending Psychiatrist: Garrison Theodore MD     Admission Diagnosis:    Principal Problem:    Major depressive disorder, recurrent episode (HCC)  Active Problems:    Medical clearance for psychiatric admission    Acquired hypothyroidism    Tobacco abuse    Alcohol abuse    Essential hypertension    Mixed hyperlipidemia    Constipation      Discharge Diagnosis:     Principal Problem:    Major depressive disorder, recurrent episode (HCC)  Active Problems:    Medical clearance for psychiatric admission    Acquired hypothyroidism    Tobacco abuse    Alcohol abuse    Essential hypertension    Mixed hyperlipidemia    Constipation  Resolved Problems:    * No resolved hospital problems. *      Reason for Admission/HPI:     \"Ramandeep is a 68 y.o. female with past medical history of COPD, HTN, hyperlipidemia, hypothyroidism and pertinent past psychiatric history of depression and anxiety who presents voluntarily on a 201 commitment with worsening depressive symptoms and thoughts of harming herself.     Symptoms prior to admission include worsening depressed mood, anhedonia, decreased energy, guilt, suicidal ideation, poor appetite, weight loss, and anxiety symptoms. Symptoms have been worsening for the past 5 months with slowly worsening course since that time. Stressors preceding admission include  the death of her  5 months ago and the death of a close friend 1 month ago . Please see documentation from the ED/Crisis/Consulting physician for " "further details about initial presentation.     On initial evaluation, Ramandeep states that she is experiencing worsening depression and anxiety since her  passed away 5 months ago. She complains of suicidal ideation without a plan, decreased energy, generalized weakness, sadness, anhedonia, decreased appetite, guilt, and weight loss. Depressive symptoms are currently a 9/10 and anxiety symptoms are currently 7/10. She is currently here visiting her sister and currently lives in [facility]. Patient reports depression and anxiety for several years with a past hospitalization 2 years ago for depression and anxiety. Pt reports being on medications for depression and anxiety in the past but is unsure of medication names. Patient is not currently on psychiatric/antidepressant medications and is not currently following up with outpatient psychiatry/behavior health. Patient denies symptoms consistent with kishore such as periods of elevated mood, distractibility, and decreased need for sleep. Patient also denies prior suicide attempts, homicidal ideation, auditory/visual hallucinations, paranoia or delusions. She is amenable to starting medication and contracts for safety. Her goal is to improve depression symptoms during this hospital admission.\"       Past Medical History:   Diagnosis Date    Anxiety     Blood clot in vein     Depression     Disease of thyroid gland     Hypertension      No past surgical history on file.    Medications:    All current active medications have been reviewed.    Allergies:     No Known Allergies    Please refer to the initial H&P for full details.      Vital signs in last 24 hours:    Temp:  [97 °F (36.1 °C)-98.2 °F (36.8 °C)] 97 °F (36.1 °C)  HR:  [65-82] 65  Resp:  [15-18] 15  BP: (128-169)/(60-82) 147/82      Intake/Output Summary (Last 24 hours) at 2/6/2024 1138  Last data filed at 2/6/2024 0749  Gross per 24 hour   Intake 1440 ml   Output --   Net 1440 ml "         Hospital Course:   On admission, Ramandeep was admitted to the inpatient psychiatric unit and started on Behavioral Health checks every 7 minutes. During the hospitalization she was encouraged to attend individual therapy, group therapy, milieu therapy and occupational therapy.  Upon admission she was seen by medical service for medical clearance for inpatient treatment and medical follow up.    Ramandeep was started on Prozac 10 mg daily to assist with mood including recent increase in depressive symptoms, anxiety and anhedonia.  Patient's symptoms noted to be improving during course of stay, more active and social on the unit.  Patient also noted to be attending groups.  No anxiety currently verbalized, patient still continues to report mild depression related to psychosocial stressors.  Advised to follow-up with psychiatrist in [place] as patient plans to return soon.  Patient also given education on Prozac and adverse effects of applicable.  No SI/HI.  No AVH.  Crisis and safety plan reviewed, we will continue with current plan of care including discharge to home with family today.     Ramandeep's medications were titrated as appropriate until discharge, including:    Prozac 20 mg daily for mood  Melatonin 3 mg nightly for insomnia    Prior to beginning of treatment medications risks and benefits and possible side effects including risk of suicidality and serotonin syndrome related to treatment with antidepressants were reviewed with Ramandeep. Ramandeep verbalized understanding and agreement for treatment.  Ramandeep tolerated these medications with no acute side effects. The patient's mood brightened over the course of treatment, and she was seen in [facility] interacting appropriately with peers. Ramandeep did not demonstrate dangerous behavior to self or others during her inpatient stay.     On the day of discharge, she denied suicidal ideation, intent or plan at the time of discharge and denied homicidal  ideation, intent or plan at the time of discharge. She was participating appropriately in milieu at the time of discharge. Behavior was appropriate on the unit at the time of discharge. Sleep and appetite were improved.      Since she was doing well at the end of the hospitalization, treatment team felt that she could be safely discharged to outpatient care. The outpatient follow up with a psychiatrist was arranged by the unit  upon discharge.    I reviewed with Ramandeep the importance of compliance with medications and outpatient treatment after discharge., I discussed the medication regimen and possible side effects of the medications with Ramandeep prior to discharge. At the time of discharge she was tolerating psychiatric medications., I discussed outpatient follow up with Ramandeep., I reviewed with Ramandeep crisis plan and safety plan upon discharge., Ramandeep was competent to understand risks and benefits of withholding information and risks and benefits of her actions., and Ramandeep agreed to abstain from drug and alcohol use after discharge. The patient understands the importance of taking their medications and attending their outpatient appointments. The patient knows that if there are concerns for safety to utilize their coping skills and can call the suicide hotline as well as 911 if there are concerns for safety.      Behavioral Health Medications: all current active meds have been reviewed and continue current psychiatric medications.  Discharge on Two Antipsychotic Medications : No    Labs/Imaging:   I have personally reviewed all pertinent laboratory/tests results.    Mental Status at time of Discharge:   Appearance:  age appropriate, dressed appropriately, looks stated age, sitting comfortably in chair, adequate hygiene and grooming, cooperative with interview, good eye contact    Behavior:  cooperative   Speech:  normal rate, normal volume, normal pitch, fluent, clear, and coherent  "  Mood:  \"good\"   Affect:  mood-congruent   Language Within normal limits   Thought Process:  organized, logical, goal directed, normal rate of thoughts   Associations: intact associations      Thought Content:  No verbalized delusions   Perceptual Disturbances: Denies auditory or visual hallucinations   Risk Potential: Denies suicidal or homicidal ideation, plan, or intent   Sensorium:  person, place, time, and current situation   Cognition:  Grossly intact   Consciousness:  alert and awake   Attention: attention span and concentration were age appropriate   Insight:  fair   Judgment: fair   Intellect appears to be of average intelligence   Gait/Station: normal gait/station   Motor Activity: no abnormal movements     Suicide/Homicide Risk Assessment:  Risk of Harm to Self:   The following ratings are based on assessment at the time of discharge  Demographic risk factors include: , age: over 50 or older  Historical Risk Factors include: none  Current Specific Risk Factors include: recent inpatient psychiatric admission - being discharged today  Protective Factors: no current suicidal ideation  Weapons/Firearms: none. The following steps have been taken to ensure weapons are properly secured: not applicable  Based on today's assessment, Ramandeep presents the following risk of harm to self: minimal    Risk of Harm to Others:  The following ratings are based on assessment at the time of discharge  Demographic Risk Factors include: none.  Historical Risk Factors include: none.  Current Specific Risk Factors include: none  Protective Factors: no current homicidal ideation  Weapons/Firearms: none. The following steps have been taken to ensure weapons are properly secured: not applicable  Based on today's assessment, Salvadoria presents the following risk of harm to others: minimal    The following interventions are recommended: outpatient follow up with a psychiatrist    Discharge Medications:  See list above, as " well as the after visit summary for all reconciled discharge medications provided to patient and family.      Discharge instructions/Information to patient and family:   See after visit summary for information provided to patient and family.      Provisions for Follow-Up Care:  See after visit summary for information related to follow-up care and any pertinent home health orders.            This note was completed in part utilizing Dragon dictation Software. Grammatical, translation, syntax errors, random word insertions, spelling mistakes, and incomplete sentences may be an occasional consequence of this system secondary to software limitations with voice recognition, ambient noise, and hardware issues. If you have any questions or concerns about the content, text, or information contained within the body of this dictation, please contact the provider for clarification.

## 2024-02-06 NOTE — NURSING NOTE
Pt taken down to lobby with [facility]. Pt leaving with AVS and all belongings. Discharge instructions discussed with pt, pt verbalized understanding.

## 2024-02-06 NOTE — TREATMENT TEAM
02/06/24 0738   Team Meeting   Meeting Type Daily Rounds   Initial Conference Date 02/06/24   Team Members Present   Team Members Present Physician;Nurse;;;Occupational Therapist   Physician Team Member Laurita Estrada   Nursing Team Member Cherelle Machado   Care Management Team Member Halima   Social Work Team Member Theresa RUCKER Team Member Kj   Patient/Family Present   Patient Present No   Patient's Family Present No   Pharmacy: [facility]    Patient will discharge home today at 13h. Patient's sister will be picking her up.